# Patient Record
Sex: FEMALE | Race: WHITE | Employment: FULL TIME | URBAN - METROPOLITAN AREA
[De-identification: names, ages, dates, MRNs, and addresses within clinical notes are randomized per-mention and may not be internally consistent; named-entity substitution may affect disease eponyms.]

---

## 2019-04-29 ENCOUNTER — OFFICE VISIT (OUTPATIENT)
Dept: FAMILY MEDICINE | Facility: CLINIC | Age: 49
End: 2019-04-29
Payer: COMMERCIAL

## 2019-04-29 VITALS
HEIGHT: 65 IN | SYSTOLIC BLOOD PRESSURE: 106 MMHG | BODY MASS INDEX: 36.62 KG/M2 | DIASTOLIC BLOOD PRESSURE: 81 MMHG | TEMPERATURE: 97.2 F | HEART RATE: 125 BPM | OXYGEN SATURATION: 98 % | RESPIRATION RATE: 16 BRPM | WEIGHT: 219.8 LBS

## 2019-04-29 DIAGNOSIS — Z13.220 LIPID SCREENING: ICD-10-CM

## 2019-04-29 DIAGNOSIS — I48.91 ATRIAL FIBRILLATION, UNSPECIFIED TYPE (H): Primary | ICD-10-CM

## 2019-04-29 DIAGNOSIS — R00.2 PALPITATIONS: ICD-10-CM

## 2019-04-29 DIAGNOSIS — R42 DIZZINESS: ICD-10-CM

## 2019-04-29 LAB
ALBUMIN SERPL-MCNC: 4.2 G/DL (ref 3.4–5)
ALP SERPL-CCNC: 93 U/L (ref 40–150)
ALT SERPL W P-5'-P-CCNC: 31 U/L (ref 0–50)
ANION GAP SERPL CALCULATED.3IONS-SCNC: 6 MMOL/L (ref 3–14)
AST SERPL W P-5'-P-CCNC: 20 U/L (ref 0–45)
BILIRUB SERPL-MCNC: 0.6 MG/DL (ref 0.2–1.3)
BUN SERPL-MCNC: 19 MG/DL (ref 7–30)
CALCIUM SERPL-MCNC: 8.9 MG/DL (ref 8.5–10.1)
CHLORIDE SERPL-SCNC: 110 MMOL/L (ref 94–109)
CHOLEST SERPL-MCNC: 171 MG/DL
CO2 SERPL-SCNC: 25 MMOL/L (ref 20–32)
CREAT SERPL-MCNC: 0.87 MG/DL (ref 0.52–1.04)
ERYTHROCYTE [DISTWIDTH] IN BLOOD BY AUTOMATED COUNT: 13.9 % (ref 10–15)
GFR SERPL CREATININE-BSD FRML MDRD: 78 ML/MIN/{1.73_M2}
GLUCOSE SERPL-MCNC: 97 MG/DL (ref 70–99)
HCT VFR BLD AUTO: 42.2 % (ref 35–47)
HDLC SERPL-MCNC: 53 MG/DL
HGB BLD-MCNC: 13.8 G/DL (ref 11.7–15.7)
LDLC SERPL CALC-MCNC: 106 MG/DL
MCH RBC QN AUTO: 29.9 PG (ref 26.5–33)
MCHC RBC AUTO-ENTMCNC: 32.7 G/DL (ref 31.5–36.5)
MCV RBC AUTO: 91 FL (ref 78–100)
NONHDLC SERPL-MCNC: 118 MG/DL
PLATELET # BLD AUTO: 279 10E9/L (ref 150–450)
POTASSIUM SERPL-SCNC: 4.4 MMOL/L (ref 3.4–5.3)
PROT SERPL-MCNC: 7.6 G/DL (ref 6.8–8.8)
RBC # BLD AUTO: 4.62 10E12/L (ref 3.8–5.2)
SODIUM SERPL-SCNC: 141 MMOL/L (ref 133–144)
TRIGL SERPL-MCNC: 59 MG/DL
TSH SERPL DL<=0.005 MIU/L-ACNC: 1.87 MU/L (ref 0.4–4)
VIT B12 SERPL-MCNC: 652 PG/ML (ref 193–986)
WBC # BLD AUTO: 6.7 10E9/L (ref 4–11)

## 2019-04-29 PROCEDURE — 36415 COLL VENOUS BLD VENIPUNCTURE: CPT | Performed by: FAMILY MEDICINE

## 2019-04-29 PROCEDURE — 99204 OFFICE O/P NEW MOD 45 MIN: CPT | Performed by: FAMILY MEDICINE

## 2019-04-29 PROCEDURE — 80053 COMPREHEN METABOLIC PANEL: CPT | Performed by: FAMILY MEDICINE

## 2019-04-29 PROCEDURE — 80061 LIPID PANEL: CPT | Performed by: FAMILY MEDICINE

## 2019-04-29 PROCEDURE — 82607 VITAMIN B-12: CPT | Performed by: FAMILY MEDICINE

## 2019-04-29 PROCEDURE — 84443 ASSAY THYROID STIM HORMONE: CPT | Performed by: FAMILY MEDICINE

## 2019-04-29 PROCEDURE — 85027 COMPLETE CBC AUTOMATED: CPT | Performed by: FAMILY MEDICINE

## 2019-04-29 PROCEDURE — 93000 ELECTROCARDIOGRAM COMPLETE: CPT | Performed by: FAMILY MEDICINE

## 2019-04-29 PROCEDURE — 82306 VITAMIN D 25 HYDROXY: CPT | Performed by: FAMILY MEDICINE

## 2019-04-29 RX ORDER — MULTIPLE VITAMINS W/ MINERALS TAB 9MG-400MCG
1 TAB ORAL DAILY
COMMUNITY
End: 2019-07-30

## 2019-04-29 RX ORDER — CARVEDILOL 3.12 MG/1
3.12 TABLET ORAL 2 TIMES DAILY WITH MEALS
Qty: 30 TABLET | Refills: 1 | Status: SHIPPED | OUTPATIENT
Start: 2019-04-29 | End: 2019-04-29

## 2019-04-29 RX ORDER — CARVEDILOL 3.12 MG/1
3.12 TABLET ORAL 2 TIMES DAILY WITH MEALS
Qty: 60 TABLET | Refills: 1 | Status: SHIPPED | OUTPATIENT
Start: 2019-04-29 | End: 2019-07-08

## 2019-04-29 SDOH — HEALTH STABILITY: MENTAL HEALTH: HOW OFTEN DO YOU HAVE A DRINK CONTAINING ALCOHOL?: NEVER

## 2019-04-29 ASSESSMENT — MIFFLIN-ST. JEOR: SCORE: 1614.95

## 2019-04-29 NOTE — PATIENT INSTRUCTIONS
Patient Education     Atrial Fibrillation    Atrial fibrillation is a condition in which the heart beats in an irregular pattern. It is caused by a problem in the heart's electrical pathways. It can be a sign of heart disease or other health problems that affect the heart.  Heart palpitations are the most common symptom of atrial fibrillation. This is the feeling that your heart is fluttering, beating fast, hard, or irregular. When the heart beats too fast, it doesn't pump blood very well. This can cause other symptoms like anxiety, fatigue, shortness of breath, chest pain, dizziness, or fainting. Atrial fibrillation may come and go. It can last from a few hours to a couple of days. Or, it may become chronic, lasting for months at a time or even become permanent.  Atrial fibrillation may be caused by heart disease or other conditions in the body that affect the heart:    Coronary artery disease (atherosclerosis)    High blood pressure    Disease of the heart valves    Enlarged heart    Heart failure  Atrial fibrillation can also occur without heart disease because of:    Overactive thyroid (hyperthyroid)    Chronic lung disease (COPD, emphysema, bronchitis)    Heavy alcohol use    Cardiac stimulants like cocaine, amphetamines, diet pills, certain decongestant cold medicines, caffeine, or nicotine    Infection    Blood clot in the lung (pulmonary embolus)    Diabetes    Chronic kidney disease    Obesity    Extreme athletic conditioning  Treating or removing these causes will help your treatment for atrial fibrillation. It will also make it less likely for the atrial fibrillation to come back.  Atrial fibrillation can alternate back and forth with another abnormal rhythm called atrial flutter. Atrial flutter is a more regular heart rhythm and is also associated with an increased stroke risk. Proper treatment can lower your risk for stroke.  Home care  Follow these guidelines when caring for yourself at home:    Go  back to your usual activities as soon as you are feeling back to normal.    If you smoke, stop smoking. Contact your healthcare provider or a local stop-smoking program for help.    Don't use stimulants like alcohol, cocaine, amphetamines, diet pills, certain decongestant cold medicines, caffeine, or nicotine.    If your provider prescribed medicine to stop atrial fibrillation from coming back, take it exactly as directed. Some medicines must be taken every day, not just when you have symptoms. This will help them work as they should.    If you were prescribed warfarin to lower your risk for stroke, have your blood tested on a regular basis as advised by your provider. This will make sure you are getting the dose that is right for you. It also lower your risk for side effects.  Follow-up care  Follow up with your healthcare provider, or as advised.  When to seek medical advice  Call your healthcare provider right away if any of these following occur:    Shortness of breath or swelling in the legs gets worse    Unexpected weight gain    Chest pain or the sense that your heart is fluttering or beating fast or hard (palpitations)    Any sign of bleeding if you are on a blood thinner     Pain, redness, or swelling in one leg  Also call your provider right away if you have these signs of stroke:    Weakness of an arm or leg or one side of the face    Difficulty with speech or vision    Extreme drowsiness, confusion, dizziness, or fainting  Date Last Reviewed: 5/1/2016 2000-2018 [employer]. [address] [place] [id number]. All rights reserved. This information is not intended as a substitute for professional medical care. Always follow your healthcare professional's instructions.

## 2019-04-29 NOTE — PROGRESS NOTES
SUBJECTIVE:   Farida Toth is a 48 year old female who presents to clinic today for the following   health issues:    Establish care.   New patient to [facility].    Dizziness  Onset: x5 months    Description:   Do you feel faint:  YES  Does it feel like the surroundings (bed, room) are moving: no   Unsteady/off balance: YES  Have you passed out or fallen: no     Intensity: moderate; will occur 2 or more times/ week.     Progression of Symptoms:  same    Accompanying Signs & Symptoms:  Heart palpitations: YES  Nausea, vomiting: no   Weakness in arms or legs: YES- feels out of shape   Fatigue: YES  Vision or speech changes: no   Ringing in ears (Tinnitus): no   Hearing Loss: no     History:   Head trauma/concussion hx: no   Previous similar symptoms: no   Recent bleeding history: no     Precipitating factors:   Worse with activity or head movement: YES- Patient states the more active she is the more often it happens.   Any new medications (BP?): no   Alcohol/drug abuse/withdrawal: no     Alleviating factors:   Does staying in a fixed position give relief:  no     Also reporting pain in left side of neck when it occurs along with SOB and sweating.     Therapies Tried and outcome: cut out caffeine x4 months ago to help with her symptoms.      Denies having any other known chronic underlying medical problems like hypertension, heart failure or any previous history of strokes.     Additional history: as documented    Reviewed  and updated as needed this visit by clinical staff  Tobacco  Allergies  Meds  Med Hx  Surg Hx  Fam Hx  Soc Hx        Reviewed and updated as needed this visit by Provider  Tobacco  Med Hx  Surg Hx  Fam Hx  Soc Hx        Patient Active Problem List   Diagnosis     Family history of colon cancer in mother     Atrial fibrillation, unspecified type (H)     Past Surgical History:   Procedure Laterality Date     NO HISTORY OF SURGERY         Social History     Tobacco Use     Smoking status:  "Never Smoker     Smokeless tobacco: Never Used   Substance Use Topics     Alcohol use: Never     Frequency: Never     Family History   Problem Relation Age of Onset     Colon Cancer Mother         Dx at age 58 and  at age 59 with mets     Stomach Cancer Father      Breast Cancer Sister         Dx in her 40s         Current Outpatient Medications   Medication Sig Dispense Refill     aspirin (ASA) 81 MG EC tablet Take 1 tablet (81 mg) by mouth daily 90 tablet 3     BIOTIN PO        carvedilol (COREG) 3.125 MG tablet Take 1 tablet (3.125 mg) by mouth 2 times daily (with meals) 60 tablet 1     multivitamin w/minerals (MULTI-VITAMIN) tablet Take 1 tablet by mouth daily       No Known Allergies    ROS:  Constitutional, HEENT, cardiovascular, pulmonary, GI, , musculoskeletal, skin, endocrine and psych systems are negative, except as otherwise noted.  Neuro as described above    OBJECTIVE:     /81 (BP Location: Left arm, Patient Position: Standing, Cuff Size: Adult Large)   Pulse 125   Temp 97.2  F (36.2  C) (Tympanic)   Resp 16   Ht 1.638 m (5' 4.5\")   Wt 99.7 kg (219 lb 12.8 oz)   SpO2 98%   Breastfeeding? No   BMI 37.15 kg/m    Body mass index is 37.15 kg/m .  GENERAL: healthy, alert and no distress  HENT: ear canals and TM's normal, nose and mouth without ulcers or lesions  NECK: no adenopathy, no asymmetry, masses, or scars and thyroid normal to palpation  RESP: lungs clear to auscultation - no rales, rhonchi or wheezes  CV: irregular rate and rhythm, normal S1 S2, no S3 or S4, no murmur, click or rub, no peripheral edema and peripheral pulses strong  ABDOMEN: soft, nontender, no hepatosplenomegaly, no masses and bowel sounds normal  MS: no gross musculoskeletal defects noted, no edema  NEURO: Normal strength and tone, sensory exam grossly normal, mentation intact and cranial nerves 2-12 intact    Diagnostic Test Results:  Labs drawn and in process  EKG - atrial fibrillation, rate 90 bpm, normal " axis, normal intervals, no acute ST/T changes c/w ischemia, no LVH by voltage criteria, there are no prior tracings available    ASSESSMENT/PLAN:     Farida was seen today for dizziness.    Diagnoses and all orders for this visit:    Atrial fibrillation, unspecified type (H); new onset  Hemodynamically stable without heart failure. [id number] 0-1  -     Start: carvedilol (COREG) 3.125 MG tablet; Take 1 tablet (3.125 mg) by mouth 2 times daily (with meals)  -     Start: aspirin (ASA) 81 MG EC tablet; Take 1 tablet (81 mg) by mouth daily  -     Echocardiogram Complete; Future  -     CARDIOLOGY EVAL ADULT REFERRAL    Dizziness  -     CBC with platelets  -     TSH with free T4 reflex  -     Comprehensive metabolic panel  -     Vitamin B12  -     Vitamin D Deficiency  -     EKG 12-lead complete w/read - Clinics    Palpitations  -     CBC with platelets  -     TSH with free T4 reflex  -     EKG 12-lead complete w/read - Clinics      Lipid screening  -     Lipid Profile      Follow up as scheduled on 5/7/2019 for a Physical exam, sooner if needed      Leonora Chew MD  [facility]

## 2019-04-30 LAB — DEPRECATED CALCIDIOL+CALCIFEROL SERPL-MC: 32 UG/L (ref 20–75)

## 2019-05-02 ENCOUNTER — ANCILLARY PROCEDURE (OUTPATIENT)
Dept: CARDIOLOGY | Facility: CLINIC | Age: 49
End: 2019-05-02
Attending: FAMILY MEDICINE
Payer: COMMERCIAL

## 2019-05-02 DIAGNOSIS — I48.91 ATRIAL FIBRILLATION, UNSPECIFIED TYPE (H): ICD-10-CM

## 2019-05-02 PROCEDURE — 40000264 ZZHC STATISTIC IV PUSH SINGLE INITIAL SUBSTANCE: Performed by: INTERNAL MEDICINE

## 2019-05-02 PROCEDURE — 93306 TTE W/DOPPLER COMPLETE: CPT | Mod: GC | Performed by: INTERNAL MEDICINE

## 2019-05-02 RX ADMIN — Medication 7 ML: at 12:00

## 2019-05-03 ENCOUNTER — HEALTH MAINTENANCE LETTER (OUTPATIENT)
Age: 49
End: 2019-05-03

## 2019-05-03 ASSESSMENT — ENCOUNTER SYMPTOMS
PARESTHESIAS: 0
CONSTIPATION: 0
EYE PAIN: 0
HEADACHES: 0
JOINT SWELLING: 0
FEVER: 0
HEMATURIA: 0
WEAKNESS: 0
SORE THROAT: 0
HEMATOCHEZIA: 0
SHORTNESS OF BREATH: 0
FREQUENCY: 0
DIARRHEA: 0
DYSURIA: 0
NAUSEA: 0
BREAST MASS: 0
MYALGIAS: 0
DIZZINESS: 1
COUGH: 0
ABDOMINAL PAIN: 0
ARTHRALGIAS: 0
NERVOUS/ANXIOUS: 0
HEARTBURN: 0
PALPITATIONS: 0
CHILLS: 0

## 2019-05-07 ENCOUNTER — OFFICE VISIT (OUTPATIENT)
Dept: FAMILY MEDICINE | Facility: CLINIC | Age: 49
End: 2019-05-07
Payer: COMMERCIAL

## 2019-05-07 VITALS
SYSTOLIC BLOOD PRESSURE: 99 MMHG | HEIGHT: 65 IN | RESPIRATION RATE: 16 BRPM | WEIGHT: 219.6 LBS | HEART RATE: 118 BPM | TEMPERATURE: 97.6 F | BODY MASS INDEX: 36.59 KG/M2 | DIASTOLIC BLOOD PRESSURE: 53 MMHG | OXYGEN SATURATION: 97 %

## 2019-05-07 DIAGNOSIS — Z12.4 CERVICAL CANCER SCREENING: ICD-10-CM

## 2019-05-07 DIAGNOSIS — L82.1 SEBORRHEIC KERATOSIS: ICD-10-CM

## 2019-05-07 DIAGNOSIS — Z12.31 ENCOUNTER FOR SCREENING MAMMOGRAM FOR BREAST CANCER: ICD-10-CM

## 2019-05-07 DIAGNOSIS — Z00.00 ROUTINE GENERAL MEDICAL EXAMINATION AT A HEALTH CARE FACILITY: Primary | ICD-10-CM

## 2019-05-07 DIAGNOSIS — I48.91 ATRIAL FIBRILLATION, UNSPECIFIED TYPE (H): ICD-10-CM

## 2019-05-07 PROCEDURE — G0124 SCREEN C/V THIN LAYER BY MD: HCPCS | Performed by: FAMILY MEDICINE

## 2019-05-07 PROCEDURE — G0145 SCR C/V CYTO,THINLAYER,RESCR: HCPCS | Performed by: FAMILY MEDICINE

## 2019-05-07 PROCEDURE — 87624 HPV HI-RISK TYP POOLED RSLT: CPT | Performed by: FAMILY MEDICINE

## 2019-05-07 PROCEDURE — 99396 PREV VISIT EST AGE 40-64: CPT | Performed by: FAMILY MEDICINE

## 2019-05-07 ASSESSMENT — ENCOUNTER SYMPTOMS
HEARTBURN: 0
PARESTHESIAS: 0
DIARRHEA: 0
NERVOUS/ANXIOUS: 0
CONSTIPATION: 0
HEMATOCHEZIA: 0
SORE THROAT: 0
BREAST MASS: 0
MYALGIAS: 0
JOINT SWELLING: 0
HEMATURIA: 0
DIZZINESS: 1
ABDOMINAL PAIN: 0
PALPITATIONS: 0
EYE PAIN: 0
HEADACHES: 0
NAUSEA: 0
DYSURIA: 0
FEVER: 0
SHORTNESS OF BREATH: 0
WEAKNESS: 0
COUGH: 0
ARTHRALGIAS: 0
FREQUENCY: 0
CHILLS: 0

## 2019-05-07 ASSESSMENT — MIFFLIN-ST. JEOR: SCORE: 1614.04
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
## 2019-05-07 NOTE — PATIENT INSTRUCTIONS
Preventive Health Recommendations  Female Ages 40 to 49    Yearly exam:     See your health care provider every year in order to  1. Review health changes.   2. Discuss preventive care.    3. Review your medicines if your doctor prescribed any.      Get a Pap test every three years (unless you have an abnormal result and your provider advises testing more often).      If you get Pap tests with HPV test, you only need to test every 5 years, unless you have an abnormal result. You do not need a Pap test if your uterus was removed (hysterectomy) and you have not had cancer.      You should be tested each year for STDs (sexually transmitted diseases), if you're at risk.     Ask your doctor if you should have a mammogram.      Have a colonoscopy (test for colon cancer) if someone in your family has had colon cancer or polyps before age 50.       Have a cholesterol test every 5 years.       Have a diabetes test (fasting glucose) after age 45. If you are at risk for diabetes, you should have this test every 3 years.    Shots: Get a flu shot each year. Get a tetanus shot every 10 years.     Nutrition:     Eat at least 5 servings of fruits and vegetables each day.    Eat whole-grain bread, whole-wheat pasta and brown rice instead of white grains and rice.    Get adequate Calcium and Vitamin D.      Lifestyle    Exercise at least 150 minutes a week (an average of 30 minutes a day, 5 days a week). This will help you control your weight and prevent disease.    Limit alcohol to one drink per day.    No smoking.     Wear sunscreen to prevent skin cancer.    See your dentist every six months for an exam and cleaning.  Patient Education     Understanding Seborrheic Keratosis  Seborrheic keratoses are wart-like growths on the skin. These growths are not cancer. Many people get them, especially after age 30.  How to say it  [id number]   What causes seborrheic keratoses?  Doctors do not know what causes seborrheic  keratoses. They may run in families. In addition, they become more common as people get older.  What are the symptoms of seborrheic keratoses?  Here is what seborrheic keratoses often look like:  They tend to be round or oval in shape. They can be very small or about as big across as a quarter.  They can appear singly or in clusters.  They tend to be tan, brown, or black in color.  The edges may be scalloped or uneven-looking.  They can have a waxy or scaly look.  They can be a bit raised, appearing to be  stuck on  the skin.  They may become red and irritated if scratched or rubbed by clothing  Sebhorreic keratoses are not painful, but they may be itchy. They can become irritated if they are continually rubbed by skin or clothing. Seborrheic keratoses most often appear on the face, arms, chest, back, or belly.  How are seborrheic keratoses treated?  Seborrheic keratoses don t need to be treated unless they bother you. You may choose to have them removed because you find them unattractive. You may also want them removed because they get irritated and uncomfortable. A healthcare provider can remove them in an office visit. Ways that seborrheic keratoses can be removed include:  Freezing them off with liquid nitrogen  Cutting them off with a scalpel  Burning them off with electricity  What are the complications of seborrheic keratoses?  Seborrheic keratoses are not cancer, but they can look like some types of skin cancer. So it s a good idea to ask your healthcare provider to check any new skin growths. Ask your healthcare provider about any skin problem that concerns you.  When should I call my healthcare provider?  Call your healthcare provider right away if any of these occur:  You develop a lot of seborrheic keratoses very quickly  You have a sore that does not heal within a few weeks, or heals and then comes back  You have a mole or skin growth that is changing in size, shape, or color  You have a mole or skin  growth that looks different on one side from the other  You have a mole or skin growth that is not the same color throughout   Date Last Reviewed: 5/1/2016 2000-2018 [employer]. [address] [place] [address], [place] [address]. All rights reserved. This information is not intended as a substitute for professional medical care. Always follow your healthcare professional's instructions.

## 2019-05-09 ENCOUNTER — OFFICE VISIT (OUTPATIENT)
Dept: CARDIOLOGY | Facility: CLINIC | Age: 49
End: 2019-05-09
Payer: COMMERCIAL

## 2019-05-09 ENCOUNTER — HOSPITAL ENCOUNTER (OUTPATIENT)
Facility: CLINIC | Age: 49
End: 2019-05-09
Payer: COMMERCIAL

## 2019-05-09 VITALS
WEIGHT: 221 LBS | HEART RATE: 64 BPM | DIASTOLIC BLOOD PRESSURE: 75 MMHG | BODY MASS INDEX: 37.35 KG/M2 | SYSTOLIC BLOOD PRESSURE: 117 MMHG | OXYGEN SATURATION: 99 %

## 2019-05-09 DIAGNOSIS — I48.91 ATRIAL FIBRILLATION, UNSPECIFIED TYPE (H): Primary | ICD-10-CM

## 2019-05-09 PROCEDURE — 93000 ELECTROCARDIOGRAM COMPLETE: CPT | Performed by: INTERNAL MEDICINE

## 2019-05-09 PROCEDURE — 99215 OFFICE O/P EST HI 40 MIN: CPT | Performed by: INTERNAL MEDICINE

## 2019-05-09 RX ORDER — POTASSIUM CHLORIDE 750 MG/1
20 TABLET, EXTENDED RELEASE ORAL
Status: CANCELLED | OUTPATIENT
Start: 2019-05-09

## 2019-05-09 RX ORDER — POTASSIUM CHLORIDE 750 MG/1
40 TABLET, EXTENDED RELEASE ORAL
Status: CANCELLED | OUTPATIENT
Start: 2019-05-09

## 2019-05-09 RX ORDER — LIDOCAINE 40 MG/G
CREAM TOPICAL
Status: CANCELLED | OUTPATIENT
Start: 2019-05-09

## 2019-05-09 NOTE — PATIENT INSTRUCTIONS
Thank you for coming to the [facility] @ [facility]; please note the following instructions:    1. We have scheduled you for a TRANSESOPHAGEAL ECHOCARDIOGRAM WITH ELECTRICAL CARDIOVERSION procedure at the [facility] on ____ at _____.  Please arrive at _____ to the GOLD waiting area at the [facility].     PRE-PROCEDURE INSTRUCTIONS:    * NOTHING TO EAT OR DRINK FOR 6 HOURS PRIOR TO PROCEDURE    *TAKE USUAL MEDICATIONS WITH A SIP OF WATER    .      2.  Follow up in 1 month with an EKG.        If you have any questions regarding your visit please contact your care team:     Cardiology  Telephone Number   Aurora ARNOLD, RN  Isabel VERNON, RN   Anupama HORVATH, RMA  Kala BREWSTER, EMIRA  Olamide CASTRO, [facility]   [phone] (option 1)   For scheduling appts:     [phone] (select option 1)       For the Device Clinic (Pacemakers and ICD's)  RN's :  Celestina Brown   During business hours: [phone]    *After business hours:  [phone] (select option 4)      Normal test result notifications will be released via [employer] ([email or web address]) or mailed within 7 business days.  All other test results, will be communicated via telephone once reviewed by your cardiologist.    If you need a medication refill please contact your pharmacy.  Please allow 3 business days for your refill to be completed.    As always, thank you for trusting us with your health care needs!

## 2019-05-09 NOTE — PROGRESS NOTES
SUBJECTIVE:  Farida Toth is a 48 year old female who presents for evaluation of atrial fibrillation flutter.    Symptoms started after flulike illness during last Francesca.  Intermittently patient feels very fast heart rate.  This is mainly with exertion.  Feels short of breath, tired and dizzy.  Once she fell down and broke her cell phone.  Since December symptoms are progressive.  And patient get very short of breath with brief exertion.  She uses a Fitbit and her heart rate goes up to 140 bpm as per patient.  No prior history of cardiac arrhythmia.  No significant family history.  No significant risk factors.  No history of excess alcohol or caffeine.  No significant comorbidities.  Work as a  with [employer].  Patient Active Problem List    Diagnosis Date Noted     Atrial fibrillation, unspecified type (H) 04/29/2019     Priority: Medium     Family history of colon cancer in mother      Priority: Medium    .  Current Outpatient Medications   Medication Sig     aspirin (ASA) 81 MG EC tablet Take 1 tablet (81 mg) by mouth daily     BIOTIN PO      carvedilol (COREG) 3.125 MG tablet Take 1 tablet (3.125 mg) by mouth 2 times daily (with meals)     multivitamin w/minerals (MULTI-VITAMIN) tablet Take 1 tablet by mouth daily     No current facility-administered medications for this visit.      Past Medical History:   Diagnosis Date     Atrial fibrillation (H)      Diverticula of colon      Family history of colon cancer in mother      Uterine fibroid      Past Surgical History:   Procedure Laterality Date     NO HISTORY OF SURGERY       No Known Allergies  Social History     Socioeconomic History     Marital status: Single     Spouse name: Not on file     Number of children: Not on file     Years of education: Not on file     Highest education level: Not on file   Occupational History     Not on file   Social Needs     Financial resource strain: Not on file     Food insecurity:     Worry: Not  on file     Inability: Not on file     Transportation needs:     Medical: Not on file     Non-medical: Not on file   Tobacco Use     Smoking status: Never Smoker     Smokeless tobacco: Never Used   Substance and Sexual Activity     Alcohol use: Never     Frequency: Never     Drug use: Never     Sexual activity: Yes     Partners: Male     Birth control/protection: None   Lifestyle     Physical activity:     Days per week: Not on file     Minutes per session: Not on file     Stress: Not on file   Relationships     Social connections:     Talks on phone: Not on file     Gets together: Not on file     Attends Buddhist service: Not on file     Active member of club or organization: Not on file     Attends meetings of clubs or organizations: Not on file     Relationship status: Not on file     Intimate partner violence:     Fear of current or ex partner: Not on file     Emotionally abused: Not on file     Physically abused: Not on file     Forced sexual activity: Not on file   Other Topics Concern     Not on file   Social History Narrative     Not on file     Family History   Problem Relation Age of Onset     Colon Cancer Mother         Dx at age 58 and  at age 59 with mets     Stomach Cancer Father      Breast Cancer Sister         Dx in her 40s          REVIEW OF SYSTEMS:  General: negative, fever, chills, night sweats  Skin: negative, acne, rash and scaling  Eyes: negative, double vision, eye pain and photophobia  Ears/Nose/Throat: negative, nasal congestion and purulent rhinorrhea  Respiratory: No cough, No hemoptysis and negative  Cardiovascular: negative, chest pain, exertional chest pain or pressure, paroxysmal nocturnal dyspnea and orthopnea  Gastrointestinal: negative, dysphagia, nausea, vomiting and heartburn  Genitourinary: negative, nocturia, dysuria, frequency and urgency  Musculoskeletal: negative, fracture, back pain, neck pain and arthritis  Neurologic: negative, headaches, syncope, stroke and  seizures  Psychiatric: negative, nervous breakdown, thoughts of self-harm and thoughts of hurting someone else  Hematologic/Lymphatic/Immunologic: negative, bleeding disorder, chills and fever  Endocrine: negative, cold intolerance, heat intolerance and hot flashes       OBJECTIVE:  Blood pressure 117/75, pulse 64, weight 100.2 kg (221 lb), SpO2 99 %, not currently breastfeeding.  General Appearance: healthy, alert, active and no distress  Head: Normocephalic. No masses, lesions, tenderness or abnormalities  Eyes: conjuctiva clear, PERRL, EOM intact  Ears: External ears normal. Canals clear. TM's normal.  Nose: Nares normal  Mouth: normal  Neck: Supple, no cervical adenopathy, no thyromegaly  Lungs: clear to auscultation  Cardiac: regular rate and rhythm, normal S1 and S2, no murmur  Abdomen: Soft, nontender.  Normal bowel sounds.  No hepatosplenomegaly or abnormal masses  Extremities: no peripheral edema, peripheral pulses normal  Musculoskeletal: negative  Neurological: Cranial nerves 2-12 intact, motor strength intact       ASSESSMENT/PLAN:  Patient with the intermittent tachycardia since last Christmas.  Started after flulike symptoms.  With exertion her heart rate goes up to 140.  Feels short of breath lightheaded and dizzy.  One fall but no loss of consciousness.  No significant cardiac risk factors.  No excess coffee alcohol.  No significant family history.  Reviewed EKG.  First EKG looks like atrial flutter/fibrillation.  Today's EKG looks more like atrial flutter with variable block.  Patient is on Coreg 3.125 mg twice daily.  At rest rate is controlled.  Discussed options.  As conversion to sinus rhythm in case of flutter is difficult.  Discussed the option of ablation versus cardioversion.  Probably the whole episode started with flulike symptoms.  Will plan for cardioversion first.  If flutter recur will plan for ablation.  Reviewed echocardiogram and distal discussed with patient.  Normal LV RV size and  function.  Normal atrial size.  No significant valvular heart disease.  Will start patient on Eliquis 5 mg twice daily.  Will plan for STAN cardioversion early next week as she is very symptomatic.  Risk benefit explained to patient.  Per orders.   Return to Clinic  1 month.

## 2019-05-09 NOTE — LETTER
5/9/2019      RE: Farida Toth  [address] [facility] [address]     Dear Colleague,    Thank you for the opportunity to participate in the care of your patient, Farida Toth, at the [facility] AT [facility] at [facility]. Please see a copy of my visit note below.     SUBJECTIVE:  Farida Toth is a 48 year old female who presents for evaluation of atrial fibrillation flutter.    Symptoms started after flulike illness during last Lester.  Intermittently patient feels very fast heart rate.  This is mainly with exertion.  Feels short of breath, tired and dizzy.  Once she fell down and broke her cell phone.  Since December symptoms are progressive.  And patient get very short of breath with brief exertion.  She uses a Fitbit and her heart rate goes up to 140 bpm as per patient.  No prior history of cardiac arrhythmia.  No significant family history.  No significant risk factors.  No history of excess alcohol or caffeine.  No significant comorbidities.  Work as a  with [employer].  Patient Active Problem List    Diagnosis Date Noted     Atrial fibrillation, unspecified type (H) 04/29/2019     Priority: Medium     Family history of colon cancer in mother      Priority: Medium    .  Current Outpatient Medications   Medication Sig     aspirin (ASA) 81 MG EC tablet Take 1 tablet (81 mg) by mouth daily     BIOTIN PO      carvedilol (COREG) 3.125 MG tablet Take 1 tablet (3.125 mg) by mouth 2 times daily (with meals)     multivitamin w/minerals (MULTI-VITAMIN) tablet Take 1 tablet by mouth daily     No current facility-administered medications for this visit.      Past Medical History:   Diagnosis Date     Atrial fibrillation (H)      Diverticula of colon      Family history of colon cancer in mother      Uterine fibroid      Past Surgical History:   Procedure Laterality Date     NO HISTORY OF SURGERY       No Known  Allergies  Social History     Socioeconomic History     Marital status: Single     Spouse name: Not on file     Number of children: Not on file     Years of education: Not on file     Highest education level: Not on file   Occupational History     Not on file   Social Needs     Financial resource strain: Not on file     Food insecurity:     Worry: Not on file     Inability: Not on file     Transportation needs:     Medical: Not on file     Non-medical: Not on file   Tobacco Use     Smoking status: Never Smoker     Smokeless tobacco: Never Used   Substance and Sexual Activity     Alcohol use: Never     Frequency: Never     Drug use: Never     Sexual activity: Yes     Partners: Male     Birth control/protection: None   Lifestyle     Physical activity:     Days per week: Not on file     Minutes per session: Not on file     Stress: Not on file   Relationships     Social connections:     Talks on phone: Not on file     Gets together: Not on file     Attends Christian service: Not on file     Active member of club or organization: Not on file     Attends meetings of clubs or organizations: Not on file     Relationship status: Not on file     Intimate partner violence:     Fear of current or ex partner: Not on file     Emotionally abused: Not on file     Physically abused: Not on file     Forced sexual activity: Not on file   Other Topics Concern     Not on file   Social History Narrative     Not on file     Family History   Problem Relation Age of Onset     Colon Cancer Mother         Dx at age 58 and  at age 59 with mets     Stomach Cancer Father      Breast Cancer Sister         Dx in her 40s          REVIEW OF SYSTEMS:  General: negative, fever, chills, night sweats  Skin: negative, acne, rash and scaling  Eyes: negative, double vision, eye pain and photophobia  Ears/Nose/Throat: negative, nasal congestion and purulent rhinorrhea  Respiratory: No cough, No hemoptysis and negative  Cardiovascular: negative, chest  pain, exertional chest pain or pressure, paroxysmal nocturnal dyspnea and orthopnea  Gastrointestinal: negative, dysphagia, nausea, vomiting and heartburn  Genitourinary: negative, nocturia, dysuria, frequency and urgency  Musculoskeletal: negative, fracture, back pain, neck pain and arthritis  Neurologic: negative, headaches, syncope, stroke and seizures  Psychiatric: negative, nervous breakdown, thoughts of self-harm and thoughts of hurting someone else  Hematologic/Lymphatic/Immunologic: negative, bleeding disorder, chills and fever  Endocrine: negative, cold intolerance, heat intolerance and hot flashes       OBJECTIVE:  Blood pressure 117/75, pulse 64, weight 100.2 kg (221 lb), SpO2 99 %, not currently breastfeeding.  General Appearance: healthy, alert, active and no distress  Head: Normocephalic. No masses, lesions, tenderness or abnormalities  Eyes: conjuctiva clear, PERRL, EOM intact  Ears: External ears normal. Canals clear. TM's normal.  Nose: Nares normal  Mouth: normal  Neck: Supple, no cervical adenopathy, no thyromegaly  Lungs: clear to auscultation  Cardiac: regular rate and rhythm, normal S1 and S2, no murmur  Abdomen: Soft, nontender.  Normal bowel sounds.  No hepatosplenomegaly or abnormal masses  Extremities: no peripheral edema, peripheral pulses normal  Musculoskeletal: negative  Neurological: Cranial nerves 2-12 intact, motor strength intact       ASSESSMENT/PLAN:  Patient with the intermittent tachycardia since last Christmas.  Started after flulike symptoms.  With exertion her heart rate goes up to 140.  Feels short of breath lightheaded and dizzy.  One fall but no loss of consciousness.  No significant cardiac risk factors.  No excess coffee alcohol.  No significant family history.  Reviewed EKG.  First EKG looks like atrial flutter/fibrillation.  Today's EKG looks more like atrial flutter with variable block.  Patient is on Coreg 3.125 mg twice daily.  At rest rate is controlled.  Discussed  options.  As conversion to sinus rhythm in case of flutter is difficult.  Discussed the option of ablation versus cardioversion.  Probably the whole episode started with flulike symptoms.  Will plan for cardioversion first.  If flutter recur will plan for ablation.  Reviewed echocardiogram and distal discussed with patient.  Normal LV RV size and function.  Normal atrial size.  No significant valvular heart disease.  Will start patient on Eliquis 5 mg twice daily.  Will plan for STAN cardioversion early next week as she is very symptomatic.  Risk benefit explained to patient.  Per orders.   Return to Clinic  1 month.    Please do not hesitate to contact me if you have any questions/concerns.     Sincerely,     MIKY Mejia MD

## 2019-05-12 ENCOUNTER — ANESTHESIA EVENT (OUTPATIENT)
Dept: SURGERY | Facility: CLINIC | Age: 49
End: 2019-05-12
Payer: COMMERCIAL

## 2019-05-12 RX ORDER — SODIUM CHLORIDE, SODIUM LACTATE, POTASSIUM CHLORIDE, CALCIUM CHLORIDE 600; 310; 30; 20 MG/100ML; MG/100ML; MG/100ML; MG/100ML
INJECTION, SOLUTION INTRAVENOUS CONTINUOUS
Status: CANCELLED | OUTPATIENT
Start: 2019-05-12

## 2019-05-12 RX ORDER — FENTANYL CITRATE 50 UG/ML
25-50 INJECTION, SOLUTION INTRAMUSCULAR; INTRAVENOUS EVERY 5 MIN PRN
Status: CANCELLED | OUTPATIENT
Start: 2019-05-12

## 2019-05-12 RX ORDER — MEPERIDINE HYDROCHLORIDE 50 MG/ML
12.5 INJECTION INTRAMUSCULAR; INTRAVENOUS; SUBCUTANEOUS
Status: CANCELLED | OUTPATIENT
Start: 2019-05-12

## 2019-05-12 RX ORDER — ONDANSETRON 4 MG/1
4 TABLET, ORALLY DISINTEGRATING ORAL EVERY 30 MIN PRN
Status: CANCELLED | OUTPATIENT
Start: 2019-05-12

## 2019-05-12 RX ORDER — NALOXONE HYDROCHLORIDE 0.4 MG/ML
.1-.4 INJECTION, SOLUTION INTRAMUSCULAR; INTRAVENOUS; SUBCUTANEOUS
Status: CANCELLED | OUTPATIENT
Start: 2019-05-12 | End: 2019-05-13

## 2019-05-12 RX ORDER — ONDANSETRON 2 MG/ML
4 INJECTION INTRAMUSCULAR; INTRAVENOUS EVERY 30 MIN PRN
Status: CANCELLED | OUTPATIENT
Start: 2019-05-12

## 2019-05-12 NOTE — ANESTHESIA PREPROCEDURE EVALUATION
Anesthesia Pre-Procedure Evaluation    Patient: Farida Toth   MRN:     [id number] Gender:   female   Age:    48 year old :      1970        Preoperative Diagnosis: Atrial Fibrillation   Procedure(s):  Anesthesia Coverage Cardioversion @1130     Past Medical History:   Diagnosis Date     Atrial fibrillation (H)      Diverticula of colon      Family history of colon cancer in mother      Uterine fibroid       Past Surgical History:   Procedure Laterality Date     NO HISTORY OF SURGERY            Anesthesia Evaluation     . Pt has had prior anesthetic.            ROS/MED HX    ENT/Pulmonary:       Neurologic:       Cardiovascular:     (+) ----. Taking blood thinners : Instructions Given to patient: apixaban. . . :. . Previous cardiac testing Echodate:19results:   Interpretation Summary     Normal global and regional left ventricular function, calculated LVEF 58%  using traced biplane with contrast.  Right ventricular function, chamber size, wall motion, and thickness are  normal.  No significant valvular abnormalities.  The inferior vena cava was normal in size with preserved respiratory  variability.  _____________________________________________________________________________date: results:ECG reviewed date:19 results:A flutter with variable AVB Rate 72 date: results:          METS/Exercise Tolerance:     Hematologic:         Musculoskeletal:         GI/Hepatic:         Renal/Genitourinary:         Endo:     (+) Obesity, .      Psychiatric:         Infectious Disease:         Malignancy:         Other:                         PHYSICAL EXAM:   Mental Status/Neuro: A/A/O; Age Appropriate   Airway: Facies: Feasible  Mallampati: I  Mouth/Opening: Full  TM distance: > 6 cm  Neck ROM: Full   Respiratory: Auscultation: CTAB     Resp. Rate: Normal     Resp. Effort: Normal      CV: Rhythm: Irregular   Comments:      Dental: Normal                  Lab Results   Component Value Date    WBC 6.7 2019  "   HGB 13.8 04/29/2019    HCT 42.2 04/29/2019     04/29/2019     04/29/2019    POTASSIUM 4.4 04/29/2019    CHLORIDE 110 (H) 04/29/2019    CO2 25 04/29/2019    BUN 19 04/29/2019    CR 0.87 04/29/2019    GLC 97 04/29/2019    NOVA 8.9 04/29/2019    ALBUMIN 4.2 04/29/2019    PROTTOTAL 7.6 04/29/2019    ALT 31 04/29/2019    AST 20 04/29/2019    ALKPHOS 93 04/29/2019    BILITOTAL 0.6 04/29/2019    TSH 1.87 04/29/2019       Preop Vitals  BP Readings from Last 3 Encounters:   05/09/19 117/75   05/07/19 99/53   04/29/19 106/81    Pulse Readings from Last 3 Encounters:   05/09/19 64   05/07/19 118   04/29/19 125      Resp Readings from Last 3 Encounters:   05/07/19 16   04/29/19 16    SpO2 Readings from Last 3 Encounters:   05/09/19 99%   05/07/19 97%   04/29/19 98%      Temp Readings from Last 1 Encounters:   05/07/19 36.4  C (97.6  F) (Tympanic)    Ht Readings from Last 1 Encounters:   05/07/19 1.638 m (5' 4.5\")      Wt Readings from Last 1 Encounters:   05/09/19 100.2 kg (221 lb)    Estimated body mass index is 37.35 kg/m  as calculated from the following:    Height as of 5/7/19: 1.638 m (5' 4.5\").    Weight as of 5/9/19: 100.2 kg (221 lb).     LDA:            Assessment:   ASA SCORE: 2    NPO Status: > 2 hours since completed Clear Liquids; > 6 hours since completed Solid Foods   Documentation: H&P complete; Preop Testing complete; Consents complete   Proceeding: Proceed without further delay  Tobacco Use:  NO Active use of Tobacco/UNKNOWN Tobacco use status     Plan:   Anes. Type:  General   Pre-Induction: Midazolam IV   Induction:  IV (Standard)   Airway: Oral ETT   Access/Monitoring: PIV   Maintenance: Balanced   Emergence: Procedure Site   Logistics: Same Day Surgery     Postop Pain/Sedation Strategy:  Standard-Options: Opioids PRN     PONV Management:  Adult Risk Factors: Female, Non-Smoker, Postop Opioids  Prevention: Ondansetron; Propofol Infusion     CONSENT: Direct conversation   Plan and risks " discussed with: Patient   Blood Products: Consent Deferred (Minimal Blood Loss)                         Juanis Flores MD

## 2019-05-13 ENCOUNTER — ANESTHESIA (OUTPATIENT)
Dept: SURGERY | Facility: CLINIC | Age: 49
End: 2019-05-13
Payer: COMMERCIAL

## 2019-05-13 ENCOUNTER — APPOINTMENT (OUTPATIENT)
Dept: MEDSURG UNIT | Facility: CLINIC | Age: 49
End: 2019-05-13
Payer: COMMERCIAL

## 2019-05-13 ENCOUNTER — HOSPITAL ENCOUNTER (OUTPATIENT)
Dept: CARDIOLOGY | Facility: CLINIC | Age: 49
End: 2019-05-13
Attending: INTERNAL MEDICINE
Payer: COMMERCIAL

## 2019-05-13 ENCOUNTER — APPOINTMENT (OUTPATIENT)
Dept: LAB | Facility: CLINIC | Age: 49
End: 2019-05-13
Attending: INTERNAL MEDICINE
Payer: COMMERCIAL

## 2019-05-13 ENCOUNTER — HOSPITAL ENCOUNTER (OUTPATIENT)
Facility: CLINIC | Age: 49
Discharge: HOME | End: 2019-05-13
Attending: INTERNAL MEDICINE | Admitting: INTERNAL MEDICINE
Payer: COMMERCIAL

## 2019-05-13 VITALS
HEART RATE: 71 BPM | OXYGEN SATURATION: 96 % | SYSTOLIC BLOOD PRESSURE: 105 MMHG | DIASTOLIC BLOOD PRESSURE: 72 MMHG | RESPIRATION RATE: 14 BRPM

## 2019-05-13 VITALS
TEMPERATURE: 98 F | SYSTOLIC BLOOD PRESSURE: 114 MMHG | RESPIRATION RATE: 16 BRPM | HEART RATE: 64 BPM | OXYGEN SATURATION: 100 % | DIASTOLIC BLOOD PRESSURE: 86 MMHG

## 2019-05-13 VITALS
HEART RATE: 78 BPM | TEMPERATURE: 97.4 F | SYSTOLIC BLOOD PRESSURE: 108 MMHG | RESPIRATION RATE: 18 BRPM | DIASTOLIC BLOOD PRESSURE: 78 MMHG | OXYGEN SATURATION: 98 %

## 2019-05-13 DIAGNOSIS — I48.91 ATRIAL FIBRILLATION, UNSPECIFIED TYPE (H): ICD-10-CM

## 2019-05-13 LAB
B-HCG SERPL-ACNC: 2 IU/L (ref 0–5)
COPATH REPORT: ABNORMAL
INR PPP: 1.34 (ref 0.86–1.14)
MAGNESIUM SERPL-MCNC: 2.3 MG/DL (ref 1.6–2.3)
PAP: ABNORMAL
POTASSIUM SERPL-SCNC: 4.4 MMOL/L (ref 3.4–5.3)

## 2019-05-13 PROCEDURE — 93010 ELECTROCARDIOGRAM REPORT: CPT | Performed by: INTERNAL MEDICINE

## 2019-05-13 PROCEDURE — 83735 ASSAY OF MAGNESIUM: CPT | Performed by: INTERNAL MEDICINE

## 2019-05-13 PROCEDURE — 40000166 ZZH STATISTIC PP CARE STAGE 1

## 2019-05-13 PROCEDURE — 99152 MOD SED SAME PHYS/QHP 5/>YRS: CPT

## 2019-05-13 PROCEDURE — 40000065 ZZH STATISTIC EKG NON-CHARGEABLE

## 2019-05-13 PROCEDURE — 84702 CHORIONIC GONADOTROPIN TEST: CPT | Performed by: INTERNAL MEDICINE

## 2019-05-13 PROCEDURE — 25000128 H RX IP 250 OP 636: Performed by: INTERNAL MEDICINE

## 2019-05-13 PROCEDURE — 25000125 ZZHC RX 250: Performed by: INTERNAL MEDICINE

## 2019-05-13 PROCEDURE — 93320 DOPPLER ECHO COMPLETE: CPT | Mod: 26 | Performed by: INTERNAL MEDICINE

## 2019-05-13 PROCEDURE — 92960 CARDIOVERSION ELECTRIC EXT: CPT | Mod: GC | Performed by: INTERNAL MEDICINE

## 2019-05-13 PROCEDURE — 25000125 ZZHC RX 250: Performed by: NURSE ANESTHETIST, CERTIFIED REGISTERED

## 2019-05-13 PROCEDURE — 92960 CARDIOVERSION ELECTRIC EXT: CPT

## 2019-05-13 PROCEDURE — 36415 COLL VENOUS BLD VENIPUNCTURE: CPT | Performed by: INTERNAL MEDICINE

## 2019-05-13 PROCEDURE — 85610 PROTHROMBIN TIME: CPT | Performed by: INTERNAL MEDICINE

## 2019-05-13 PROCEDURE — 93312 ECHO TRANSESOPHAGEAL: CPT | Mod: 26 | Performed by: INTERNAL MEDICINE

## 2019-05-13 PROCEDURE — 93320 DOPPLER ECHO COMPLETE: CPT

## 2019-05-13 PROCEDURE — 37000008 ZZH ANESTHESIA TECHNICAL FEE, 1ST 30 MIN

## 2019-05-13 PROCEDURE — 25800025 ZZH RX 258: Performed by: INTERNAL MEDICINE

## 2019-05-13 PROCEDURE — 84132 ASSAY OF SERUM POTASSIUM: CPT | Performed by: INTERNAL MEDICINE

## 2019-05-13 PROCEDURE — 93325 DOPPLER ECHO COLOR FLOW MAPG: CPT | Mod: 26 | Performed by: INTERNAL MEDICINE

## 2019-05-13 RX ORDER — POTASSIUM CHLORIDE 1500 MG/1
40 TABLET, EXTENDED RELEASE ORAL
Status: DISCONTINUED | OUTPATIENT
Start: 2019-05-13 | End: 2019-05-14 | Stop reason: HOSPADM

## 2019-05-13 RX ORDER — FENTANYL CITRATE 50 UG/ML
50-100 INJECTION, SOLUTION INTRAMUSCULAR; INTRAVENOUS
Status: COMPLETED | OUTPATIENT
Start: 2019-05-13 | End: 2019-05-13

## 2019-05-13 RX ORDER — SODIUM CHLORIDE 9 MG/ML
1000 INJECTION, SOLUTION INTRAVENOUS CONTINUOUS
Status: DISCONTINUED | OUTPATIENT
Start: 2019-05-13 | End: 2019-05-14 | Stop reason: HOSPADM

## 2019-05-13 RX ORDER — ATROPINE SULFATE 0.1 MG/ML
.5-1 INJECTION INTRAVENOUS
Status: DISCONTINUED | OUTPATIENT
Start: 2019-05-13 | End: 2019-05-14 | Stop reason: HOSPADM

## 2019-05-13 RX ORDER — LIDOCAINE 40 MG/G
CREAM TOPICAL
Status: DISCONTINUED | OUTPATIENT
Start: 2019-05-13 | End: 2019-05-14 | Stop reason: HOSPADM

## 2019-05-13 RX ORDER — POTASSIUM CHLORIDE 1500 MG/1
20 TABLET, EXTENDED RELEASE ORAL
Status: DISCONTINUED | OUTPATIENT
Start: 2019-05-13 | End: 2019-05-14 | Stop reason: HOSPADM

## 2019-05-13 RX ORDER — FENTANYL CITRATE 50 UG/ML
25-50 INJECTION, SOLUTION INTRAMUSCULAR; INTRAVENOUS
Status: DISCONTINUED | OUTPATIENT
Start: 2019-05-13 | End: 2019-05-14 | Stop reason: HOSPADM

## 2019-05-13 RX ORDER — FLUMAZENIL 0.1 MG/ML
0.2 INJECTION, SOLUTION INTRAVENOUS
Status: DISCONTINUED | OUTPATIENT
Start: 2019-05-13 | End: 2019-05-14 | Stop reason: HOSPADM

## 2019-05-13 RX ORDER — NALOXONE HYDROCHLORIDE 0.4 MG/ML
.1-.4 INJECTION, SOLUTION INTRAMUSCULAR; INTRAVENOUS; SUBCUTANEOUS
Status: DISCONTINUED | OUTPATIENT
Start: 2019-05-13 | End: 2019-05-14 | Stop reason: HOSPADM

## 2019-05-13 RX ORDER — ACYCLOVIR 200 MG/1
5 CAPSULE ORAL ONCE
Status: COMPLETED | OUTPATIENT
Start: 2019-05-13 | End: 2019-05-13

## 2019-05-13 RX ADMIN — MIDAZOLAM HYDROCHLORIDE 2.5 MG: 2 INJECTION, SOLUTION INTRAMUSCULAR; INTRAVENOUS at 12:26

## 2019-05-13 RX ADMIN — TOPICAL ANESTHETIC 0.5 ML: 200 SPRAY DENTAL; PERIODONTAL at 12:03

## 2019-05-13 RX ADMIN — LIDOCAINE HYDROCHLORIDE 30 ML: 20 SOLUTION ORAL; TOPICAL at 12:03

## 2019-05-13 RX ADMIN — SODIUM CHLORIDE 5 ML: 9 INJECTION, SOLUTION INTRAMUSCULAR; INTRAVENOUS; SUBCUTANEOUS at 12:15

## 2019-05-13 RX ADMIN — METHOHEXITAL SODIUM 55 MG: 500 INJECTION, POWDER, LYOPHILIZED, FOR SOLUTION INTRAMUSCULAR; INTRAVENOUS; RECTAL at 13:16

## 2019-05-13 RX ADMIN — FENTANYL CITRATE 75 MCG: 50 INJECTION INTRAMUSCULAR; INTRAVENOUS at 12:26

## 2019-05-13 NOTE — ANESTHESIA CARE TRANSFER NOTE
Patient: Farida Toth    Procedure(s):  Anesthesia Coverage Cardioversion @1130    Diagnosis: Atrial Fibrillation  Diagnosis Additional Information: No value filed.    Anesthesia Type:   No value filed.     Note:  Airway :Nasal Cannula  Destination: ECHO Rn.        Vitals: (Last set prior to Anesthesia Care Transfer)    CRNA VITALS  5/13/2019 1253 - 5/13/2019 1323      5/13/2019             NIBP:  117/85    Ht Rate:  73    SpO2:  94 %    Resp Rate (observed):  12    EKG:  Sinus rhythm                Electronically Signed By: ANJU Sheikh CRNA  May 13, 2019  1:23 PM

## 2019-05-13 NOTE — PROCEDURES
CARDIOVERSION    PROCEDURE:  direct current cardioversion  PROCEDURE DATE: 5/13/2019     Pre-procedure diagnosis:  Atrial flutter  Post-procedure diagnosis: normal sinus rhythm  Complications:  none    BRIEF CLINICAL HISTORY:  47 yo female with history of symptomatic atrial flutter after a URI.      PROCEDURE:  The patient arrived at the Echo Laboratory in a fasting, non-sedated state.  Informed consent was previously obtained from patient, who understood indications, risks, and benefits of the procedure.  Anticoagulation with apixaban was verified including this morning. Transesophageal echo was performed by the echo lab team to rule out intracardiac thrombus.  With help from Anesthesia, patient was deeply sedated.  150J of synchronized biphasic shock was delivered and the patient was successfully converted to normal sinus rhythm.  After sedation wore off, patient awoke and remained neurologically intact.  The patient tolerated the procedure well from a hemodynamic and respiratory standpoint without any complications.  She was observed in the Echo Laboratory and then discharged to home after sedation wore off and she was ambulatory.    IMPRESSION:  1.  Successful direct current cardioversion with 150J biphasic shock.    RECOMMENDATIONS/PLANS:  1.   Follow-up with Dr. Cardona  2.   Continue anticoagulation    Fredi Bronson MD  Cardiovascular Medicine Fellow  P: [phone]    EP STAFF NOTE  I was present throughout the procedure. I agree with the note above by the EP fellow.  Pb Chow MD [facility]  Cardiology - Electrophysiology

## 2019-05-13 NOTE — PROGRESS NOTES
Pt arrived via cart with RN s/p Cardioversion. VSS. Pt denies pain. Pt can eat solid food @ 1400 per MD orders. Family at bedside. Plan discharge at 1440 per MD orders.

## 2019-05-13 NOTE — PROGRESS NOTES
Pt arrived in ECHO department  for scheduled STAN.   Procedure explained, questions answered and consent signed. Discharge instructions discussed with patient.  Pt's throat sprayed at 1200, therefore pt will not be able to eat or drink until 2 hours after at 1400 .  Informed pt of this time and encouraged to start with warm fluids and soft foods.    Pt tolerated procedure well, and was given a total of 75 mcg IV fentanyl and 2.5 mg IV versed for conscious sedation.  Pt denied throat or chest pain after STAN complete.   STAN probe 60 used for procedure.  No clots visualized on STAN so proceeded with DCCV.  Anesthesia gave pt 55 mg IV brevitol for sedation and pt was DCCV at 150 Joules to a SR.  Pt denied C.P or throat pain after procedure and was D/C home after awake and VSS.      Report given to 2A RN and pt escorted to room.

## 2019-05-14 ENCOUNTER — TELEPHONE (OUTPATIENT)
Dept: CARDIOLOGY | Facility: CLINIC | Age: 49
End: 2019-05-14

## 2019-05-14 PROBLEM — R87.610 ASCUS OF CERVIX WITH NEGATIVE HIGH RISK HPV: Status: ACTIVE | Noted: 2019-05-07

## 2019-05-14 LAB
FINAL DIAGNOSIS: NORMAL
HPV HR 12 DNA CVX QL NAA+PROBE: NEGATIVE
HPV16 DNA SPEC QL NAA+PROBE: NEGATIVE
HPV18 DNA SPEC QL NAA+PROBE: NEGATIVE
INTERPRETATION ECG - MUSE: NORMAL
INTERPRETATION ECG - MUSE: NORMAL
SPECIMEN DESCRIPTION: NORMAL
SPECIMEN SOURCE CVX/VAG CYTO: NORMAL

## 2019-05-14 NOTE — ANESTHESIA POSTPROCEDURE EVALUATION
Anesthesia POST Procedure Evaluation    Patient: Farida Toth   MRN:     [id number] Gender:   female   Age:    48 year old :      1970        Preoperative Diagnosis: Atrial Fibrillation   Procedure(s):  Anesthesia Coverage Cardioversion @1130   Postop Comments: No value filed.       Anesthesia Type:  General  No value filed.    Reportable Event: NO     PAIN: Uncomplicated   Sign Out status: Comfortable, Well controlled pain     PONV: No PONV   Sign Out status:  No Nausea or Vomiting     Neuro/Psych: Uneventful perioperative course   Sign Out Status: Preoperative baseline; Age appropriate mentation     Airway/Resp.: Uneventful perioperative course   Sign Out Status: Non labored breathing, age appropriate RR; Resp. Status within EXPECTED Parameters     CV: Uneventful perioperative course   Sign Out status: Appropriate BP and perfusion indices; Appropriate HR/Rhythm     Disposition:   Sign Out in:  PACU  Disposition:  Phase II; Home  Recovery Course: Uneventful  Follow-Up: Not required           Last Anesthesia Record Vitals:      Last PACU Vitals:  No vitals data found for the desired time range.        Electronically Signed By: Juanis Flores MD, May 14, 2019, 4:03 PM

## 2019-05-14 NOTE — TELEPHONE ENCOUNTER
"Call to pt. Stated she feels \"great, never  Better\" after cardioversion.   No complaints, process went well- appreciated all staff involved.     Follow up appt confirmed. Instructed pt to stay on AC until her follow up visit scheduled with Dr torres, pt agreed, verbalized understanding  "

## 2019-05-28 ENCOUNTER — MYC MEDICAL ADVICE (OUTPATIENT)
Dept: CARDIOLOGY | Facility: CLINIC | Age: 49
End: 2019-05-28

## 2019-05-30 ENCOUNTER — ANCILLARY PROCEDURE (OUTPATIENT)
Dept: MAMMOGRAPHY | Facility: CLINIC | Age: 49
End: 2019-05-30
Attending: FAMILY MEDICINE
Payer: COMMERCIAL

## 2019-05-30 DIAGNOSIS — Z12.31 ENCOUNTER FOR SCREENING MAMMOGRAM FOR BREAST CANCER: ICD-10-CM

## 2019-05-30 PROCEDURE — 77063 BREAST TOMOSYNTHESIS BI: CPT | Mod: TC

## 2019-05-30 PROCEDURE — 77067 SCR MAMMO BI INCL CAD: CPT | Mod: TC

## 2019-06-06 ENCOUNTER — OFFICE VISIT (OUTPATIENT)
Dept: CARDIOLOGY | Facility: CLINIC | Age: 49
End: 2019-06-06
Payer: COMMERCIAL

## 2019-06-06 VITALS
SYSTOLIC BLOOD PRESSURE: 115 MMHG | DIASTOLIC BLOOD PRESSURE: 64 MMHG | HEART RATE: 64 BPM | WEIGHT: 220 LBS | BODY MASS INDEX: 37.18 KG/M2 | OXYGEN SATURATION: 98 %

## 2019-06-06 DIAGNOSIS — I48.91 ATRIAL FIBRILLATION, UNSPECIFIED TYPE (H): Primary | ICD-10-CM

## 2019-06-06 DIAGNOSIS — I48.92 ATRIAL FLUTTER, UNSPECIFIED TYPE (H): ICD-10-CM

## 2019-06-06 PROCEDURE — 93000 ELECTROCARDIOGRAM COMPLETE: CPT | Performed by: INTERNAL MEDICINE

## 2019-06-06 PROCEDURE — 99213 OFFICE O/P EST LOW 20 MIN: CPT | Performed by: INTERNAL MEDICINE

## 2019-06-06 NOTE — PATIENT INSTRUCTIONS
Thank you for coming to the [facility] @ [facility]; please note the following instructions:    1. Decrease coreg as instructed by Dr Cardona in half for 2 weeks then stop    2. 1 month ziopatch    IF the ziopatch comes back with no atrial fibrillation or flutter, we will stop the eliquis      If you have any questions regarding your visit please contact your care team:     Cardiology  Telephone Number   Aurora QURESHI., RN  Isabel VERNON, RN   Anupama HORVATH, RMA  Kala BREWSTER, RMA  Olamide CASTRO, [facility]   [phone] (option 1)   For scheduling appts:     [phone] (select option 1)       For the Device Clinic (Pacemakers and ICD's)  RN's :  Celestina Brown   During business hours: [phone]    *After business hours:  [phone] (select option 4)      Normal test result notifications will be released via Block[employer] or mailed within 7 business days.  All other test results, will be communicated via telephone once reviewed by your cardiologist.    If you need a medication refill please contact your pharmacy.  Please allow 3 business days for your refill to be completed.    As always, thank you for trusting us with your health care needs!

## 2019-06-06 NOTE — LETTER
6/6/2019      RE: Farida Toth  [address] [facility] [address]       Dear Colleague,    Thank you for the opportunity to participate in the care of your patient, Farida Toth, at the [facility] AT [facility]. Please see a copy of my visit note below.       SUBJECTIVE:  Farida Toth is a 48 year old female who presents for post cardioversion follow-up.    In December last year patient had flulike symptoms.  This was followed by palpitation.  Especially with exertion she had a very fast heart rate and shortness of breath.  EKG showed atrial flutter.  Patient had cardioversion and here for follow-up.  Doing very well.  Patient do not have any complaints.  After cardioversion all her symptoms subsided.  Currently on Eliquis and Coreg 3.125 mg twice daily.    Patient Active Problem List    Diagnosis Date Noted     ASCUS of cervix with negative high risk HPV 05/07/2019     Priority: Medium     1991, 1995, 2006, 2010, 2012 NIL paps.   5/7/19 ASCUS pap with Neg HPV. Plan cotest in 3 years.        Atrial fibrillation, unspecified type (H) 04/29/2019     Priority: Medium     Family history of colon cancer in mother      Priority: Medium    .  Current Outpatient Medications   Medication Sig     apixaban ANTICOAGULANT (ELIQUIS) 5 MG tablet Take 1 tablet (5 mg) by mouth 2 times daily     aspirin (ASA) 81 MG EC tablet Take 1 tablet (81 mg) by mouth daily     BIOTIN PO      carvedilol (COREG) 3.125 MG tablet Take 1 tablet (3.125 mg) by mouth 2 times daily (with meals)     multivitamin w/minerals (MULTI-VITAMIN) tablet Take 1 tablet by mouth daily     No current facility-administered medications for this visit.      Past Medical History:   Diagnosis Date     Abnormal Pap smear of cervix 05/07/2019    See problem list     Atrial fibrillation (H)      Diverticula of colon      Family history of colon cancer in mother      Uterine  fibroid      Past Surgical History:   Procedure Laterality Date     ANESTHESIA CARDIOVERSION N/A 2019    Procedure: Anesthesia Coverage Cardioversion @1130;  Surgeon: GENERIC ANESTHESIA PROVIDER;  Location: [facility] OR     NO HISTORY OF SURGERY       No Known Allergies  Social History     Socioeconomic History     Marital status: Single     Spouse name: Not on file     Number of children: Not on file     Years of education: Not on file     Highest education level: Not on file   Occupational History     Not on file   Social Needs     Financial resource strain: Not on file     Food insecurity:     Worry: Not on file     Inability: Not on file     Transportation needs:     Medical: Not on file     Non-medical: Not on file   Tobacco Use     Smoking status: Never Smoker     Smokeless tobacco: Never Used   Substance and Sexual Activity     Alcohol use: Never     Frequency: Never     Drug use: Never     Sexual activity: Yes     Partners: Male     Birth control/protection: None   Lifestyle     Physical activity:     Days per week: Not on file     Minutes per session: Not on file     Stress: Not on file   Relationships     Social connections:     Talks on phone: Not on file     Gets together: Not on file     Attends Episcopal service: Not on file     Active member of club or organization: Not on file     Attends meetings of clubs or organizations: Not on file     Relationship status: Not on file     Intimate partner violence:     Fear of current or ex partner: Not on file     Emotionally abused: Not on file     Physically abused: Not on file     Forced sexual activity: Not on file   Other Topics Concern     Not on file   Social History Narrative     Not on file     Family History   Problem Relation Age of Onset     Colon Cancer Mother         Dx at age 58 and  at age 59 with mets     Stomach Cancer Father      Breast Cancer Sister         Dx in her 40s          REVIEW OF SYSTEMS:  General: negative, fever, chills, night  sweats  Skin: negative, acne, rash and scaling  Eyes: negative, double vision, eye pain and photophobia  Ears/Nose/Throat: negative, nasal congestion and purulent rhinorrhea  Respiratory: No dyspnea on exertion, No cough, No hemoptysis and negative  Cardiovascular: negative, palpitations, tachycardia, irregular heart beat, chest pain, exertional chest pain or pressure, paroxysmal nocturnal dyspnea, dyspnea on exertion and orthopnea       OBJECTIVE:  Blood pressure 115/64, pulse 64, weight 99.8 kg (220 lb), SpO2 98 %, not currently breastfeeding.  General Appearance: healthy, alert, active and no distress  Head: Normocephalic. No masses, lesions, tenderness or abnormalities  Eyes: conjuctiva clear, PERRL, EOM intact  Ears: External ears normal. Canals clear. TM's normal.  Nose: Nares normal  Mouth: normal  Neck: Supple, no cervical adenopathy, no thyromegaly  Lungs: clear to auscultation  Cardiac: regular rate and rhythm, normal S1 and S2, no murmur       ASSESSMENT:/PLAN:  Patient here for follow-up after cardioversion of atrial flutter.  Doing very well and remains asymptomatic.  Patient remained in sinus rhythm.  Today's EKG reviewed normal sinus rhythm sinus bradycardia.  As the atrial flutter was precipitated by flulike illness, will try to discontinue anticoagulation and beta-blocker.  Patient do not like taking any pill.  Advised to continue Coreg for 1 more month to is the end of month the last 1 week she will be cutting the 3.125 mg tablet into half and taking it twice a day and then will stop the Coreg after 1 week.  At that point will put a 2-weekZio0 patch and no recurrence of atrial flutter fibrillation we will discontinue the Eliquis also.  Patient will follow up on a as needed basis.  Per orders.   Return to Clinic PRN.    Please do not hesitate to contact me if you have any questions/concerns.     Sincerely,     MIKY Mejia MD

## 2019-06-06 NOTE — NURSING NOTE
"Chief Complaint   Patient presents with     RECHECK     OV with Dr. ALESSIA Cardona for 1 month s/p DCC. Pt reports repoerts no cardiac symptoms.       Initial /64 (BP Location: Left arm, Patient Position: Chair, Cuff Size: Adult Large)   Pulse 64   Wt 99.8 kg (220 lb)   SpO2 98%   BMI 37.18 kg/m   Estimated body mass index is 37.18 kg/m  as calculated from the following:    Height as of 5/7/19: 1.638 m (5' 4.5\").    Weight as of this encounter: 99.8 kg (220 lb)..  BP completed using cuff size: large    EKG was done.  Kala Champagne MA    "

## 2019-06-10 NOTE — PROGRESS NOTES
SUBJECTIVE:  Farida Toth is a 48 year old female who presents for post cardioversion follow-up.    In December last year patient had flulike symptoms.  This was followed by palpitation.  Especially with exertion she had a very fast heart rate and shortness of breath.  EKG showed atrial flutter.  Patient had cardioversion and here for follow-up.  Doing very well.  Patient do not have any complaints.  After cardioversion all her symptoms subsided.  Currently on Eliquis and Coreg 3.125 mg twice daily.    Patient Active Problem List    Diagnosis Date Noted     ASCUS of cervix with negative high risk HPV 05/07/2019     Priority: Medium     1991, 1995, 2006, 2010, 2012 NIL paps.   5/7/19 ASCUS pap with Neg HPV. Plan cotest in 3 years.        Atrial fibrillation, unspecified type (H) 04/29/2019     Priority: Medium     Family history of colon cancer in mother      Priority: Medium    .  Current Outpatient Medications   Medication Sig     apixaban ANTICOAGULANT (ELIQUIS) 5 MG tablet Take 1 tablet (5 mg) by mouth 2 times daily     aspirin (ASA) 81 MG EC tablet Take 1 tablet (81 mg) by mouth daily     BIOTIN PO      carvedilol (COREG) 3.125 MG tablet Take 1 tablet (3.125 mg) by mouth 2 times daily (with meals)     multivitamin w/minerals (MULTI-VITAMIN) tablet Take 1 tablet by mouth daily     No current facility-administered medications for this visit.      Past Medical History:   Diagnosis Date     Abnormal Pap smear of cervix 05/07/2019    See problem list     Atrial fibrillation (H)      Diverticula of colon      Family history of colon cancer in mother      Uterine fibroid      Past Surgical History:   Procedure Laterality Date     ANESTHESIA CARDIOVERSION N/A 5/13/2019    Procedure: Anesthesia Coverage Cardioversion @1130;  Surgeon: GENERIC ANESTHESIA PROVIDER;  Location: [facility]     NO HISTORY OF SURGERY       No Known Allergies  Social History     Socioeconomic History     Marital status: Single     Spouse name:  Not on file     Number of children: Not on file     Years of education: Not on file     Highest education level: Not on file   Occupational History     Not on file   Social Needs     Financial resource strain: Not on file     Food insecurity:     Worry: Not on file     Inability: Not on file     Transportation needs:     Medical: Not on file     Non-medical: Not on file   Tobacco Use     Smoking status: Never Smoker     Smokeless tobacco: Never Used   Substance and Sexual Activity     Alcohol use: Never     Frequency: Never     Drug use: Never     Sexual activity: Yes     Partners: Male     Birth control/protection: None   Lifestyle     Physical activity:     Days per week: Not on file     Minutes per session: Not on file     Stress: Not on file   Relationships     Social connections:     Talks on phone: Not on file     Gets together: Not on file     Attends Protestant service: Not on file     Active member of club or organization: Not on file     Attends meetings of clubs or organizations: Not on file     Relationship status: Not on file     Intimate partner violence:     Fear of current or ex partner: Not on file     Emotionally abused: Not on file     Physically abused: Not on file     Forced sexual activity: Not on file   Other Topics Concern     Not on file   Social History Narrative     Not on file     Family History   Problem Relation Age of Onset     Colon Cancer Mother         Dx at age 58 and  at age 59 with mets     Stomach Cancer Father      Breast Cancer Sister         Dx in her 40s          REVIEW OF SYSTEMS:  General: negative, fever, chills, night sweats  Skin: negative, acne, rash and scaling  Eyes: negative, double vision, eye pain and photophobia  Ears/Nose/Throat: negative, nasal congestion and purulent rhinorrhea  Respiratory: No dyspnea on exertion, No cough, No hemoptysis and negative  Cardiovascular: negative, palpitations, tachycardia, irregular heart beat, chest pain, exertional chest  pain or pressure, paroxysmal nocturnal dyspnea, dyspnea on exertion and orthopnea       OBJECTIVE:  Blood pressure 115/64, pulse 64, weight 99.8 kg (220 lb), SpO2 98 %, not currently breastfeeding.  General Appearance: healthy, alert, active and no distress  Head: Normocephalic. No masses, lesions, tenderness or abnormalities  Eyes: conjuctiva clear, PERRL, EOM intact  Ears: External ears normal. Canals clear. TM's normal.  Nose: Nares normal  Mouth: normal  Neck: Supple, no cervical adenopathy, no thyromegaly  Lungs: clear to auscultation  Cardiac: regular rate and rhythm, normal S1 and S2, no murmur       ASSESSMENT:/PLAN:  Patient here for follow-up after cardioversion of atrial flutter.  Doing very well and remains asymptomatic.  Patient remained in sinus rhythm.  Today's EKG reviewed normal sinus rhythm sinus bradycardia.  As the atrial flutter was precipitated by flulike illness, will try to discontinue anticoagulation and beta-blocker.  Patient do not like taking any pill.  Advised to continue Coreg for 1 more month to is the end of month the last 1 week she will be cutting the 3.125 mg tablet into half and taking it twice a day and then will stop the Coreg after 1 week.  At that point will put a 2-weekZio0 patch and no recurrence of atrial flutter fibrillation we will discontinue the Eliquis also.  Patient will follow up on a as needed basis.  Per orders.   Return to Clinic PRN.

## 2019-07-08 ENCOUNTER — ANCILLARY PROCEDURE (OUTPATIENT)
Dept: CARDIOLOGY | Facility: CLINIC | Age: 49
End: 2019-07-08
Attending: INTERNAL MEDICINE
Payer: COMMERCIAL

## 2019-07-08 DIAGNOSIS — I48.91 ATRIAL FIBRILLATION, UNSPECIFIED TYPE (H): ICD-10-CM

## 2019-07-08 PROCEDURE — 0296T ZZHC  EXT ECG > 48HR TO 21 DAY RCRD W/CONECT INTL RCRD: CPT | Performed by: INTERNAL MEDICINE

## 2019-07-08 PROCEDURE — 0298T ZIO PATCH HOLTER ADULT PEDIATRIC GREATER THAN 48 HRS: CPT | Performed by: INTERNAL MEDICINE

## 2019-07-29 ENCOUNTER — TELEPHONE (OUTPATIENT)
Dept: CARDIOLOGY | Facility: CLINIC | Age: 49
End: 2019-07-29

## 2019-07-29 NOTE — TELEPHONE ENCOUNTER
Patient received discontinue  ASA. And Eliquis order by telephone.     Patient will await cardiology follow up instruction .Patient informed family Doctor visit will be tomorrow in process. Olamide Mari L.P.N.

## 2019-07-29 NOTE — TELEPHONE ENCOUNTER
[facility] Call Center    Phone Message    May a detailed message be left on voicemail: yes    Reason for Call: Other: Pt returned the call she missed from Aurora. Please give her another call back.     Action Taken: Message routed to:  [facility] Cardiology

## 2019-07-29 NOTE — TELEPHONE ENCOUNTER
[facility] Call Center    Phone Message    May a detailed message be left on voicemail: yes    Reason for Call: Other: Pt has been on ELIQUIS for almost four months and said she started bleeding like she has her period on Saturday and now today it's a full blown period. Per Pt, she hasn't had her period in 5 years. Please call Pt back to discuss if this is a side affect of the medication, or something that she should be concerned with.      Action Taken: Message routed to:  [facility]

## 2019-07-29 NOTE — TELEPHONE ENCOUNTER
Preliminary zio shows no atrial fibrillation.  Per. Dr. Cardona, patient can stop Eliquis.  Attempted to contact patient, no answer, message left to call clinic back.  Aurora Lester RN

## 2019-07-29 NOTE — TELEPHONE ENCOUNTER
Date: 7/29/2019    Time of Call: 11:54 AM     Diagnosis:  Afib/menstral bleeding.  On anticoagulation     [ [facility] ] Ordering provider: Dr. torres  Order: STOP Eliquis and STOP aspirin     Order received by: Aurora Lester RN

## 2019-07-29 NOTE — TELEPHONE ENCOUNTER
Spoke to patient who states that she did take her Eliquis morning dose today.  Starting Sunday am she has been experiencing vaginal bleeding where she has to change her pad every 5 hours.  Blood clots are present.  She also noticed that she has a small bruise on her arm and on her chest.  No injury noted.  No other obvious signs of bleeding.  Denies lightheadedness, dizziness, fatigue.  Is currently at work.    Writer advised to go to urgent care today to be evaluated.  Advised stop Eliquis.  Patient wanting to know if she should continue to take aspririn.  Advised to hold at this time. Patient verbalized understanding.    Aurora Lester, ANYI  Care Coordinator  [facility] [place] [facility]  [phone]

## 2019-07-30 ENCOUNTER — OFFICE VISIT (OUTPATIENT)
Dept: FAMILY MEDICINE | Facility: CLINIC | Age: 49
End: 2019-07-30
Payer: COMMERCIAL

## 2019-07-30 VITALS
SYSTOLIC BLOOD PRESSURE: 111 MMHG | RESPIRATION RATE: 16 BRPM | OXYGEN SATURATION: 98 % | HEART RATE: 74 BPM | BODY MASS INDEX: 37.65 KG/M2 | TEMPERATURE: 97.3 F | WEIGHT: 222.8 LBS | DIASTOLIC BLOOD PRESSURE: 73 MMHG

## 2019-07-30 DIAGNOSIS — N95.0 POST-MENOPAUSAL BLEEDING: Primary | ICD-10-CM

## 2019-07-30 DIAGNOSIS — R10.31 RLQ ABDOMINAL PAIN: ICD-10-CM

## 2019-07-30 DIAGNOSIS — R10.32 LLQ ABDOMINAL PAIN: ICD-10-CM

## 2019-07-30 DIAGNOSIS — I48.91 ATRIAL FIBRILLATION, UNSPECIFIED TYPE (H): ICD-10-CM

## 2019-07-30 PROCEDURE — 99214 OFFICE O/P EST MOD 30 MIN: CPT | Performed by: NURSE PRACTITIONER

## 2019-07-30 NOTE — TELEPHONE ENCOUNTER
Spoke to patient and reviewed MD's response again.  Patient states she has stopped her Eliquis and aspirin, per Dr. Torres.  Saw primary care today and is scheduled to have a pelvic ultrasound tomorrow.  Advised patient no follow up needed with Dr. torres at this time and can see on an as needed basis.  Also advised to call with any questions or concerns.  Patient verbalized understanding.      Aurora Lester RN  Care Coordinator  [facility] [place] [facility]  [phone]        complains of pain/discomfort

## 2019-07-30 NOTE — PATIENT INSTRUCTIONS
Reminders: If you are signed up for [facility] please be aware your results and communications will be sent within your mychart. Please remember to arrive 5-10 minutes early for your appointments. If you are late you may need to reschedule your appointment.    Patient Education     Unknown Causes of Abdominal Pain (Female)    The exact cause of your belly (abdominal) pain is not clear. This does not mean that this is something to worry about. Everyone likes to know the exact cause of the problem. But sometimes with belly pain, there is no clear-cut cause, and this could be a good thing. The good news is that your symptoms can be treated, and you will feel better.   Your condition does not seem serious now. But sometimes the signs of a serious problem may take more time to appear. For this reason, it is important for you to watch for any new symptoms, problems, or worsening of your condition.  Over the next few days, the abdominal pain may come and go. Or it may be constant. Other common symptoms can include nausea and vomiting. Sometimes it can be difficult to tell if you feel nauseous. You may just feel bad and not connect that feeling to nausea. Constipation, diarrhea, and a fever may go along with the pain.  The pain may continue even if treated correctly over the following days. Depending on how things go, sometimes the cause can become clear and may need more or different treatment. Additional evaluations, medicines, or tests may also be needed.  Home care  Your healthcare provider may prescribe medicine for pain, symptoms, or an infection.  Follow the healthcare provider's instructions for taking these medicines.  General care    Rest as much as you can until your next exam. No strenuous activities.    Try to find positions that ease discomfort. A small pillow placed on the abdomen may help relieve pain.    Something warm on your abdomen (such as a heating pad) may help, but be careful not to burn  yourself.  Diet    Don t force yourself to eat, especially if having cramps, vomiting, or diarrhea.    Water is important so you don't get dehydrated. Soup may also be good. Sports drinks may also help, especially if they are not too acidic. Don't drink sugary drinks as this can make things worse. Take liquids in small amounts. Don t guzzle them.    Caffeine sometimes makes the pain and cramping worse.    Don t take dairy products if you have vomiting or diarrhea.    Don't eat large amounts at a time. Wait a few minutes between bites.    Eat a diet low in fiber (called a low-residue diet). Foods allowed include refined breads, white rice, fruit and vegetable juices without pulp, tender meats. These foods will pass more easily through the intestine.    Don t have whole-grain foods, whole fruits and vegetables, meats, seeds and nuts, fried or fatty foods, dairy, alcohol and spicy foods until your symptoms go away.  Follow-up care  Follow up with your healthcare provider, or as advised, if your pain does not begin to improve in the next 24 hours.  Call 911  Call 911 if any of these occur:    Trouble breathing    Confusion    Fainting or loss of consciousness    Rapid heart rate    Seizure  When to seek medical advice  Call your healthcare provider right away if any of these occur:    Pain gets worse or moves to the right lower abdomen    New or worsening vomiting or diarrhea    Swelling of the abdomen    Unable to pass stool for more than 3 days    Fever of 100.4 F (38 C) or higher, or as directed by your healthcare provider.    Blood in vomit or bowel movements (dark red or black color)    Yellow color of eyes and skin (jaundice)    Weakness, dizziness    Chest, arm, back, neck, or jaw pain    Unexpected vaginal bleeding or missed period    Can't keep down liquids or water and you are getting dehydrated  Date Last Reviewed: 6/1/2018 2000-2018 [employer]. [address], [place] [address]. All  rights reserved. This information is not intended as a substitute for professional medical care. Always follow your healthcare professional's instructions.

## 2019-07-31 ENCOUNTER — ANCILLARY PROCEDURE (OUTPATIENT)
Dept: ULTRASOUND IMAGING | Facility: CLINIC | Age: 49
End: 2019-07-31
Attending: NURSE PRACTITIONER
Payer: COMMERCIAL

## 2019-07-31 DIAGNOSIS — R10.32 LLQ ABDOMINAL PAIN: ICD-10-CM

## 2019-07-31 DIAGNOSIS — R10.31 RLQ ABDOMINAL PAIN: ICD-10-CM

## 2019-07-31 DIAGNOSIS — N95.0 POST-MENOPAUSAL BLEEDING: ICD-10-CM

## 2019-07-31 PROCEDURE — 76856 US EXAM PELVIC COMPLETE: CPT | Mod: 59

## 2019-07-31 PROCEDURE — 76830 TRANSVAGINAL US NON-OB: CPT

## 2019-08-05 ENCOUNTER — TELEPHONE (OUTPATIENT)
Dept: FAMILY MEDICINE | Facility: CLINIC | Age: 49
End: 2019-08-05

## 2019-08-05 DIAGNOSIS — N95.0 POST-MENOPAUSAL BLEEDING: Primary | ICD-10-CM

## 2019-08-05 DIAGNOSIS — R93.89 ENDOMETRIAL THICKENING ON ULTRASOUND: ICD-10-CM

## 2019-08-05 DIAGNOSIS — N83.201 CYSTS OF BOTH OVARIES: ICD-10-CM

## 2019-08-05 DIAGNOSIS — N83.202 CYSTS OF BOTH OVARIES: ICD-10-CM

## 2019-08-05 NOTE — TELEPHONE ENCOUNTER
Reason for Call:  Other call back    Detailed comments: Patient is requesting a call back in regards to the referral that was put in for her. She says that there was not a lot of information given to her, only that she was referred to an OB based on her US results and wants to know if this is something she can wait on or if she needs to be seen with an OB right away? Patient also submitted a Nadanu message but says she still has not heard back. She seemed anxious and would like to know the reason for the referral and what next steps she should expect. Please give patient a call at your earliest convenience. Thank you.    Phone Number Patient can be reached at: Home number on file [phone] (home)    Best Time: Anytime.    Can we leave a detailed message on this number? YES    Call taken on 8/5/2019 at 5:13 PM by Christiano Rosado

## 2019-08-05 NOTE — RESULT ENCOUNTER NOTE
Sudhir Iqbal,    Thank you for your recent office visit.    Here are your recent results.  The pelvic ultrasound did show that your uterine lining is too thick for a post menopausal woman.  It also showed two ovarian cysts. Due to the first finding, I am referring you to OBGYN for further evaluation and treatment. Please schedule with them at this time.   Feel free to contact me via Vocab or call the clinic at [phone].    Sincerely,    ANJU Coronado, FNP-BC

## 2019-08-06 NOTE — TELEPHONE ENCOUNTER
Tried calling patient. There was no answer. Left a message for her to call back if she has any further questions.   Noted recent office visit with Josi Guajardo- postmenopausal bleeding. Pelvic US revealed Endometrial thickening. Referral to OBGYN for further evaluation, Endometrial biopsy. I see an appointment scheduled tomorrow with OBGYN.  I agree with the workup done. Let us know if she has any further questions.

## 2019-08-06 NOTE — TELEPHONE ENCOUNTER
"Patient called very up set.  Stating she was very frustrated the way she received her results and not being told if this is urgent or not. Diagnosis for referral   N95.0 (ICD-10-CM) - Post-menopausal bleeding   R93.89 (ICD-10-CM) - Endometrial thickening on ultrasound   N83.201, N83.202 (ICD-10-CM) - Cysts of both ovaries     Patient was told she was referred to OB to discuss this further.  Tried to explain to patient that referrals are made for her to discuss this with the specialist so that she can get all the needed information to know what the next step would be in treatment if needed.  Patient very upset stating she had to do all the calling to get an appointment with OB.  Offered to send message to providers colleagues (ordering provider out of office)  to help better explain for patient.  Patient refused saying that \"you guys our noncompassionate and that she is going to report us\"  Patient stated nurse (me) was being rude.  Nurse was just trying to explain as nicely as possible but believe patient was already too frustrated and not effectively listening.  "

## 2019-08-07 ENCOUNTER — OFFICE VISIT (OUTPATIENT)
Dept: OBGYN | Facility: CLINIC | Age: 49
End: 2019-08-07
Payer: COMMERCIAL

## 2019-08-07 VITALS
HEART RATE: 70 BPM | BODY MASS INDEX: 37.35 KG/M2 | WEIGHT: 221 LBS | SYSTOLIC BLOOD PRESSURE: 131 MMHG | DIASTOLIC BLOOD PRESSURE: 73 MMHG | TEMPERATURE: 98.7 F

## 2019-08-07 DIAGNOSIS — N95.0 POST-MENOPAUSAL BLEEDING: Primary | ICD-10-CM

## 2019-08-07 DIAGNOSIS — R93.89 THICKENED ENDOMETRIUM: ICD-10-CM

## 2019-08-07 DIAGNOSIS — N83.209 SIMPLE OVARIAN CYST: ICD-10-CM

## 2019-08-07 LAB — HCG UR QL: NEGATIVE

## 2019-08-07 PROCEDURE — 88305 TISSUE EXAM BY PATHOLOGIST: CPT | Performed by: OBSTETRICS & GYNECOLOGY

## 2019-08-07 PROCEDURE — 58100 BIOPSY OF UTERUS LINING: CPT | Performed by: OBSTETRICS & GYNECOLOGY

## 2019-08-07 PROCEDURE — 81025 URINE PREGNANCY TEST: CPT | Performed by: OBSTETRICS & GYNECOLOGY

## 2019-08-07 PROCEDURE — 99203 OFFICE O/P NEW LOW 30 MIN: CPT | Mod: 25 | Performed by: OBSTETRICS & GYNECOLOGY

## 2019-08-07 PROCEDURE — 88305 TISSUE EXAM BY PATHOLOGIST: CPT | Mod: 26 | Performed by: OBSTETRICS & GYNECOLOGY

## 2019-08-07 NOTE — PROGRESS NOTES
GYN Clinic Note  Date: 2019    CC:  Postmenopausal Bleeding    HPI:  Farida Toth is a 48 year old female  presents for evaluation of postmenopausal bleeding in consultation from Josi Guajardo NP.       Had an episode of vaginal bleeding on 2019.  Very heavy bleeding.  Passed clots that she could feel pass.  Bleeding through clothes.  Slept on towel as she was bleeding through pads.    Prior to that, reports she had not had a period in 5 to 8 years.    History of irregular periods, frequent, heavy.  But this peisode was far worse than what she had before.    Called insurance as she was on a blood thinner, Eliquis, due to A fib.  Has since had a cardioversion and stopped the Eliquis about 1.5 weeks ago. Both insurance and cardiologist told her that this vaginal bleeding episode likely wasn't related to the Eliquis.    Her work-up thus far for her abnormal bleeding has included:  - TSH: 1.87 in 2019  - transvaginal ultrasound: 2019, stripe of 1.1 cm.  - Hgb: 13.8 in 2019     GYN HISTORY:  No LMP recorded. Patient is postmenopausal.    Menarche: 11  Menopause: early 40s  STI history: No STD history  Last Pap: 2019  History of abnormal pap: most recent pap was ASCUS, HPV neg  History of cervical procedures: denies     Patient has following risk factors for endometrial cancer:  Unopposed estrogen exposure: No  Obesity: Yes. Details:   Body mass index is 37.35 kg/m .   Smoking: No  Nulliparity: No  Infertility: No  Early age of menarche / late age of menopause: No  Family history of colon cancer, ovarian cancer, and type I endometrial cancer: Mom had colon cancer at age 58,  at age 59.  Had hysterectomy in late 30s due to bleeding?      OBSTETRIC HISTORY:   OB History    Para Term  AB Living   2 0 0 0 1 1   SAB TAB Ectopic Multiple Live Births   1 0 0 0 0      # Outcome Date GA Lbr Flako/2nd Weight Sex Delivery Anes PTL Lv   2             1 SAB                 Past Medical History:   Diagnosis Date     Abnormal Pap smear of cervix 2019    See problem list     Atrial fibrillation (H)      Diverticula of colon      Family history of colon cancer in mother      Family history of colon cancer in mother      Uterine fibroid        Past Surgical History:   Procedure Laterality Date     ANESTHESIA CARDIOVERSION N/A 2019    Procedure: Anesthesia Coverage Cardioversion @1130;  Surgeon: GENERIC ANESTHESIA PROVIDER;  Location: [facility] OR     COLONOSCOPY       NO HISTORY OF SURGERY       Social History     Tobacco Use     Smoking status: Never Smoker     Smokeless tobacco: Never Used   Substance Use Topics     Alcohol use: Never     Frequency: Never     Drug use: Never         Family History   Problem Relation Age of Onset     Colon Cancer Mother         Dx at age 58 and  at age 59 with mets     Hypertension Mother      Stomach Cancer Father      Cerebrovascular Disease Father      Other Cancer Father         Skin Cancer/ from Stomach cancer     Obesity Father      Breast Cancer Sister         Dx in her 40s     Obesity Sister      Obesity Brother      Obesity Brother        No current outpatient medications on file.       Allergies: Patient has no known allergies.    ROS:  C: NEGATIVE for fever, chills, change in weight  I: NEGATIVE for worrisome rashes, moles or lesions  E: NEGATIVE for vision changes or irritation  E/M: NEGATIVE for ear, mouth and throat problems  R: NEGATIVE for significant cough or SOB  CV: NEGATIVE for chest pain, palpitations or peripheral edema  GI: NEGATIVE for nausea, abdominal pain, heartburn, or change in bowel habits  : pos for post-menopausal bleeding.  NEGATIVE for frequency, dysuria, hematuria, vaginal discharge  M: NEGATIVE for significant arthralgias or myalgia  N: NEGATIVE for weakness, dizziness or paresthesias  E: NEGATIVE for temperature intolerance, skin/hair changes  P: NEGATIVE for changes in mood or  affect    EXAM:  Blood pressure 131/73, pulse 70, temperature 98.7  F (37.1  C), temperature source Oral, weight 100.2 kg (221 lb), not currently breastfeeding.   BMI= Body mass index is 37.35 kg/m .  General - pleasant female in no acute distress.  Abdomen - soft, nontender, nondistended, no hepatosplenomegaly.  Pelvic - external genitalia: normal adult female without lesions or abnormalities; BUS: within normal limits; Vagina: well rugated, no discharge, no lesions; Cervix: no lesions or CMT; Uterus: anteverted, 8 week sized, mobile and nontender; Adnexae: no masses or tenderness.  Rectovaginal - deferred.  Musculoskeletal - no gross deformities.  Neurological - normal strength, sensation, and mental status.  -------------------  ULTRASOUND PELVIC COMPLETE WITH TRANSVAGINAL IMAGING  7/31/2019 5:20  PM     HISTORY: Pain in right lower quadrant and left lower quadrant. Four  days of postmenopausal bleeding.      TECHNIQUE: Transabdominal images of the pelvis are supplemented with  endovaginal images to better define anatomy.     COMPARISON: None.     FINDINGS: The uterus measures 9.3 x 4.4 x 5.9 cm. There is a 1.5 cm  intramural fibroid in the mid posterior uterine body. Endometrial  stripe thickness is 1.1 cm. This is abnormal in a postmenopausal  female with bleeding and endometrial pathology is suspected. There are  also multiple tiny cystic foci within the endometrium of uncertain  etiology.     There is a 1 cm paraovarian cyst on the right.  The right ovary is  otherwise unremarkable. The left ovary shows a 2.1 x 1.8 x 1.6 cm  simple cyst. No free fluid.                                                                      IMPRESSION:   1. Abnormally thickened endometrial stripe for postmenopausal female  with bleeding. Endometrial pathology should be suspected.  2. Small bilateral ovarian cysts as described above.     PHUONG MEREDITH MD  ------------------  Procedure: Endometrial biopsy.   After informed  consent was obtained, the cervix was cleansed with betadyne, grasped with a tenaculum.  The pipel was inserted easily and four quadrant sampling was done.  Sample was sent for pathology.    Tenaculum removed, hemostasis achieved with simple pressure, minimal bleeding. Patient tolerated procedure well.  ---------------    ASSESSMENT:  Farida Toth is a 48 year old  who presents with postmenopausal bleeding. Discussed differential diagnosis: disordered proliferative endometrium (normal pathology), hyperplasia, malignancy     PLAN:  1. Post-menopausal bleeding with thickened endometrium.  - ENDOMETRIAL BIOPSY W/O CERVICAL DILATION  - Surgical pathology exam    Discussed postmenopausal bleeding and possible causes   Endometrial biopsy was done today to rule out hyperplasia and malignancy.  Transvaginal ultrasound: completed prior to today's appointment   Cervical cancer screening: up to date.    2.  Simple ovarian cyst  Consider repeat US in 6-8w for ovarian cyst.  Patient would like to wait to get biopsy results back before scheduling US.    Aware that if results show abnormality like cancer, she would be referred to a specialist.      Sandie Michel MD

## 2019-08-07 NOTE — NURSING NOTE
"Chief Complaint   Patient presents with     Biopsy     ENDOMETRIAL       Initial /73 (BP Location: Left arm, Patient Position: Sitting, Cuff Size: Adult Large)   Pulse 70   Temp 98.7  F (37.1  C) (Oral)   Wt 100.2 kg (221 lb)   BMI 37.35 kg/m   Estimated body mass index is 37.35 kg/m  as calculated from the following:    Height as of 19: 1.638 m (5' 4.5\").    Weight as of this encounter: 100.2 kg (221 lb).  BP completed using cuff size: large    Questioned patient about current smoking habits.  Pt. has never smoked.          The following HM Due: mammogram      The following patient reported/Care Every where data was sent to:  P ABSTRACT QUALITY INITIATIVES [20720]  ARLENE Gonzalez           "

## 2019-08-12 LAB — COPATH REPORT: NORMAL

## 2019-11-10 ENCOUNTER — APPOINTMENT (OUTPATIENT)
Dept: GENERAL RADIOLOGY | Facility: CLINIC | Age: 49
End: 2019-11-10
Attending: FAMILY MEDICINE
Payer: COMMERCIAL

## 2019-11-10 ENCOUNTER — HOSPITAL ENCOUNTER (EMERGENCY)
Facility: CLINIC | Age: 49
Discharge: HOME | End: 2019-11-10
Attending: FAMILY MEDICINE | Admitting: FAMILY MEDICINE
Payer: COMMERCIAL

## 2019-11-10 VITALS
HEART RATE: 79 BPM | RESPIRATION RATE: 10 BRPM | DIASTOLIC BLOOD PRESSURE: 76 MMHG | OXYGEN SATURATION: 98 % | SYSTOLIC BLOOD PRESSURE: 123 MMHG

## 2019-11-10 DIAGNOSIS — K52.9 GASTROENTERITIS: ICD-10-CM

## 2019-11-10 LAB
ALBUMIN SERPL-MCNC: 4.2 G/DL (ref 3.4–5)
ALP SERPL-CCNC: 103 U/L (ref 40–150)
ALT SERPL W P-5'-P-CCNC: 36 U/L (ref 0–50)
ANION GAP SERPL CALCULATED.3IONS-SCNC: 6 MMOL/L (ref 3–14)
AST SERPL W P-5'-P-CCNC: 21 U/L (ref 0–45)
BASOPHILS # BLD AUTO: 0 10E9/L (ref 0–0.2)
BASOPHILS NFR BLD AUTO: 0.2 %
BILIRUB SERPL-MCNC: 0.7 MG/DL (ref 0.2–1.3)
BUN SERPL-MCNC: 23 MG/DL (ref 7–30)
CALCIUM SERPL-MCNC: 9.1 MG/DL (ref 8.5–10.1)
CHLORIDE SERPL-SCNC: 110 MMOL/L (ref 94–109)
CO2 SERPL-SCNC: 24 MMOL/L (ref 20–32)
CREAT SERPL-MCNC: 0.83 MG/DL (ref 0.52–1.04)
DIFFERENTIAL METHOD BLD: ABNORMAL
EOSINOPHIL # BLD AUTO: 0 10E9/L (ref 0–0.7)
EOSINOPHIL NFR BLD AUTO: 0.2 %
ERYTHROCYTE [DISTWIDTH] IN BLOOD BY AUTOMATED COUNT: 12.5 % (ref 10–15)
GFR SERPL CREATININE-BSD FRML MDRD: 83 ML/MIN/{1.73_M2}
GLUCOSE SERPL-MCNC: 157 MG/DL (ref 70–99)
HCT VFR BLD AUTO: 47.2 % (ref 35–47)
HGB BLD-MCNC: 15.3 G/DL (ref 11.7–15.7)
IMM GRANULOCYTES # BLD: 0 10E9/L (ref 0–0.4)
IMM GRANULOCYTES NFR BLD: 0.3 %
LYMPHOCYTES # BLD AUTO: 0.5 10E9/L (ref 0.8–5.3)
LYMPHOCYTES NFR BLD AUTO: 4.3 %
MCH RBC QN AUTO: 29.5 PG (ref 26.5–33)
MCHC RBC AUTO-ENTMCNC: 32.4 G/DL (ref 31.5–36.5)
MCV RBC AUTO: 91 FL (ref 78–100)
MONOCYTES # BLD AUTO: 0.2 10E9/L (ref 0–1.3)
MONOCYTES NFR BLD AUTO: 2.1 %
NEUTROPHILS # BLD AUTO: 10 10E9/L (ref 1.6–8.3)
NEUTROPHILS NFR BLD AUTO: 92.9 %
NRBC # BLD AUTO: 0 10*3/UL
NRBC BLD AUTO-RTO: 0 /100
PLATELET # BLD AUTO: 301 10E9/L (ref 150–450)
POTASSIUM SERPL-SCNC: 3.9 MMOL/L (ref 3.4–5.3)
PROT SERPL-MCNC: 8.4 G/DL (ref 6.8–8.8)
RBC # BLD AUTO: 5.19 10E12/L (ref 3.8–5.2)
SODIUM SERPL-SCNC: 140 MMOL/L (ref 133–144)
TROPONIN I SERPL-MCNC: <0.015 UG/L (ref 0–0.04)
WBC # BLD AUTO: 10.8 10E9/L (ref 4–11)

## 2019-11-10 PROCEDURE — 96361 HYDRATE IV INFUSION ADD-ON: CPT | Performed by: FAMILY MEDICINE

## 2019-11-10 PROCEDURE — 99285 EMERGENCY DEPT VISIT HI MDM: CPT | Mod: 25 | Performed by: FAMILY MEDICINE

## 2019-11-10 PROCEDURE — 25000132 ZZH RX MED GY IP 250 OP 250 PS 637: Performed by: FAMILY MEDICINE

## 2019-11-10 PROCEDURE — 71046 X-RAY EXAM CHEST 2 VIEWS: CPT

## 2019-11-10 PROCEDURE — 25800030 ZZH RX IP 258 OP 636: Performed by: FAMILY MEDICINE

## 2019-11-10 PROCEDURE — 25000128 H RX IP 250 OP 636: Performed by: FAMILY MEDICINE

## 2019-11-10 PROCEDURE — 80053 COMPREHEN METABOLIC PANEL: CPT | Performed by: FAMILY MEDICINE

## 2019-11-10 PROCEDURE — 96375 TX/PRO/DX INJ NEW DRUG ADDON: CPT | Performed by: FAMILY MEDICINE

## 2019-11-10 PROCEDURE — 96374 THER/PROPH/DIAG INJ IV PUSH: CPT | Performed by: FAMILY MEDICINE

## 2019-11-10 PROCEDURE — C9113 INJ PANTOPRAZOLE SODIUM, VIA: HCPCS | Performed by: FAMILY MEDICINE

## 2019-11-10 PROCEDURE — 25000125 ZZHC RX 250: Performed by: FAMILY MEDICINE

## 2019-11-10 PROCEDURE — 93005 ELECTROCARDIOGRAM TRACING: CPT | Performed by: FAMILY MEDICINE

## 2019-11-10 PROCEDURE — 93010 ELECTROCARDIOGRAM REPORT: CPT | Mod: Z6 | Performed by: FAMILY MEDICINE

## 2019-11-10 PROCEDURE — 85025 COMPLETE CBC W/AUTO DIFF WBC: CPT | Performed by: FAMILY MEDICINE

## 2019-11-10 PROCEDURE — 84484 ASSAY OF TROPONIN QUANT: CPT | Performed by: FAMILY MEDICINE

## 2019-11-10 RX ORDER — ONDANSETRON 2 MG/ML
4 INJECTION INTRAMUSCULAR; INTRAVENOUS EVERY 30 MIN PRN
Status: DISCONTINUED | OUTPATIENT
Start: 2019-11-10 | End: 2019-11-10

## 2019-11-10 RX ORDER — ONDANSETRON 2 MG/ML
8 INJECTION INTRAMUSCULAR; INTRAVENOUS ONCE
Status: COMPLETED | OUTPATIENT
Start: 2019-11-10 | End: 2019-11-10

## 2019-11-10 RX ORDER — ONDANSETRON 4 MG/1
4-8 TABLET, ORALLY DISINTEGRATING ORAL EVERY 8 HOURS PRN
Qty: 12 TABLET | Refills: 0 | Status: SHIPPED | OUTPATIENT
Start: 2019-11-10 | End: 2019-11-13

## 2019-11-10 RX ORDER — SODIUM CHLORIDE 9 MG/ML
1000 INJECTION, SOLUTION INTRAVENOUS CONTINUOUS
Status: DISCONTINUED | OUTPATIENT
Start: 2019-11-10 | End: 2019-11-10 | Stop reason: HOSPADM

## 2019-11-10 RX ADMIN — PANTOPRAZOLE SODIUM 40 MG: 40 INJECTION, POWDER, FOR SOLUTION INTRAVENOUS at 13:56

## 2019-11-10 RX ADMIN — LIDOCAINE HYDROCHLORIDE 30 ML: 20 SOLUTION ORAL; TOPICAL at 14:51

## 2019-11-10 RX ADMIN — ONDANSETRON 8 MG: 2 INJECTION INTRAMUSCULAR; INTRAVENOUS at 13:52

## 2019-11-10 RX ADMIN — SODIUM CHLORIDE 1000 ML: 9 INJECTION, SOLUTION INTRAVENOUS at 13:51

## 2019-11-10 NOTE — DISCHARGE INSTRUCTIONS
Return to the Emergency Room if the following occurs:     Severely worsened pain, worsened breathing, dehydration, or for any concern at anytime.    Or, follow-up with the following provider as we discussed:     Return to your primary doctor as needed, or if not improved over the next 5 days.    Medications discussed:    Zofran for nausea, as needed.    If you received pain-relieving or sedating medication during your time in the ER, avoid alcohol, driving automobiles, or working with machinery.  Also, a responsible adult must stay with you.        Call the Nurse Advice Line at [phone] or [phone] for any concern at anytime.

## 2019-11-10 NOTE — ED AVS SNAPSHOT
[facility] Emergency Department  [address] [address]  Phone:  [phone]  Fax:  [phone]                                    Farida Toth   MRN: [id number]    Department:  [facility] Emergency Department   Date of Visit:  11/10/2019           After Visit Summary Signature Page    I have received my discharge instructions, and my questions have been answered. I have discussed any challenges I see with this plan with the nurse or doctor.    ..........................................................................................................................................  Patient/Patient Representative Signature      ..........................................................................................................................................  Patient Representative Print Name and Relationship to Patient    ..................................................               ................................................  Date                                   Time    ..........................................................................................................................................  Reviewed by Signature/Title    ...................................................              ..............................................  Date                                               Time          22EPIC Rev 08/18

## 2019-11-10 NOTE — ED PROVIDER NOTES
HPI   The patient is a 49-year-old female presenting with nausea, vomiting, diarrhea, and chest pain.  Symptoms began abruptly at about 2:00 AM.  She awoke from sleep with nausea and abdominal discomfort.  She proceeded to throw up multiple times through the night.  She also had multiple episodes of diarrhea.  No hematemesis or coffee-ground emesis.  No hematochezia or melena.  Starting at about 5:00 AM she recognized central chest pain that would radiate to her mid back.  This is been constant since onset.  It waxes and wanes without obvious cause.  Her current level of pain is moderate though it has been severe.  She denies abdominal pain currently.  She has been dyspneic occasionally through the night.  She was on a hike yesterday and had no issues with exertion.  She denies other sick contacts.  She denies obviously tainted food.  No recent travel.  No recent antibiotics.  No fever.        Allergies:  No Known Allergies  Problem List:    Patient Active Problem List    Diagnosis Date Noted     Post-menopausal bleeding 07/30/2019     Priority: Medium     ASCUS of cervix with negative high risk HPV 05/07/2019     Priority: Medium     1991, 1995, 2006, 2010, 2012 NIL paps.   5/7/19 ASCUS pap with Neg HPV. Plan cotest in 3 years.        Atrial fibrillation, unspecified type (H) 04/29/2019     Priority: Medium     Family history of colon cancer in mother      Priority: Medium      Past Medical History:    Past Medical History:   Diagnosis Date     Abnormal Pap smear of cervix 05/07/2019     Atrial fibrillation (H)      Diverticula of colon      Family history of colon cancer in mother      Family history of colon cancer in mother      Uterine fibroid      Past Surgical History:    Past Surgical History:   Procedure Laterality Date     ANESTHESIA CARDIOVERSION N/A 5/13/2019    Procedure: Anesthesia Coverage Cardioversion @1130;  Surgeon: GENERIC ANESTHESIA PROVIDER;  Location: [facility] OR     COLONOSCOPY       MYOMECTOMY  UTERUS  2011    hysteroscopic myomectomy     NO HISTORY OF SURGERY       Family History:    Family History   Problem Relation Age of Onset     Colon Cancer Mother         Dx at age 58 and  at age 59 with mets     Hypertension Mother      Other - See Comments Mother         had hysterectomy in her 30s, for fibroids?     Stomach Cancer Father      Cerebrovascular Disease Father      Other Cancer Father         Skin Cancer/ from Stomach cancer     Obesity Father      Breast Cancer Sister         Dx in her 40s     Obesity Sister      Obesity Brother      Obesity Brother      Social History:  Marital Status:  Single [1]  Social History     Tobacco Use     Smoking status: Never Smoker     Smokeless tobacco: Never Used   Substance Use Topics     Alcohol use: Never     Frequency: Never     Drug use: Never      Medications:    ondansetron (ZOFRAN ODT) 4 MG ODT tab      Review of Systems   All other systems reviewed and are negative.      PE   BP: (!) 138/102  Pulse: 118  Heart Rate: 101  Resp: 16  SpO2: 99 %  Physical Exam  Vitals signs reviewed.   Constitutional:       General: She is in acute distress.      Appearance: She is well-developed.      Comments: Obviously uncomfortable.  Cooperative though and answering questions well.   HENT:      Head: Normocephalic and atraumatic.   Eyes:      Extraocular Movements: Extraocular movements intact.      Conjunctiva/sclera: Conjunctivae normal.      Pupils: Pupils are equal, round, and reactive to light.   Neck:      Musculoskeletal: Normal range of motion.   Cardiovascular:      Rate and Rhythm: Regular rhythm. Tachycardia present.      Heart sounds: Normal heart sounds.   Pulmonary:      Effort: Pulmonary effort is normal.   Abdominal:      Palpations: Abdomen is soft.      Tenderness: There is no tenderness.      Comments: Epigastric tenderness.  Soft throughout.  No organomegaly or mass.   Musculoskeletal: Normal range of motion.   Skin:     General: Skin is warm and  dry.   Neurological:      Mental Status: She is alert and oriented to person, place, and time.   Psychiatric:         Behavior: Behavior normal.         ED COURSE and MDM   1343.  The patient has nausea with vomiting and diarrhea since about 2:00 AM this morning.  She has had midline chest and back discomfort since about 5:00 AM.  Her EKG is documented below and unremarkable.  Lab values pending.  GI cocktail, Protonix, Zofran, fluid bolus ordered.  Lab values pending.  Chest x-ray.    1515.  Her work-up is unremarkable.  For acute coronary obstruction or myocardial infarction or myocarditis.  No evidence for air within the mediastinum or esophageal rupture.  The patient feels much better and would like to go home.  Zofran prescription provided.  Follow-up discussed.  Gastroenteritis likely.    EKG  (1344)   Interpretation performed by me.  Rate: 83     Rhythm: sinus     Axis: nl  Intervals: OK (12-2) 126, QRS (<12) 98, QTc (>5) 408  P wave: nl     QRS complex: nl  ST segment / T-wave: nl  Conclusion: nl    LABS  Labs Ordered and Resulted from Time of ED Arrival Up to the Time of Departure from the ED   CBC WITH PLATELETS DIFFERENTIAL - Abnormal; Notable for the following components:       Result Value    Hematocrit 47.2 (*)     Absolute Neutrophil 10.0 (*)     Absolute Lymphocytes 0.5 (*)     All other components within normal limits   COMPREHENSIVE METABOLIC PANEL - Abnormal; Notable for the following components:    Chloride 110 (*)     Glucose 157 (*)     All other components within normal limits   TROPONIN I   PERIPHERAL IV CATHETER       IMAGING  Images reviewed by me.  Radiology report also reviewed.  XR Chest 2 Views   Preliminary Result   IMPRESSION: No evidence of acute cardiopulmonary disease is seen.          Procedures    Medications   0.9% sodium chloride BOLUS (0 mLs Intravenous Stopped 11/10/19 1450)     Followed by   sodium chloride 0.9% infusion (has no administration in time range)   ondansetron  (ZOFRAN) injection 8 mg (8 mg Intravenous Given 11/10/19 1352)   pantoprazole (PROTONIX) 40 mg IV push injection (40 mg Intravenous Given 11/10/19 1356)   lidocaine (XYLOCAINE) 2 % 15 mL, alum & mag hydroxide-simethicone (MYLANTA ES/MAALOX  ES) 15 mL GI Cocktail (30 mLs Oral Given 11/10/19 1451)         IMPRESSION       ICD-10-CM    1. Gastroenteritis K52.9             Medication List      Started    ondansetron 4 MG ODT tab  Commonly known as:  ZOFRAN ODT  4-8 mg, Oral, EVERY 8 HOURS PRN                          Tom Fajardo MD  11/10/19 8935

## 2019-11-10 NOTE — ED NOTES
Pt c/o vomiting and diarrhea mult times since 2 am and a few hours later developed cp that radiates through to the back and into shoulder blades.  Hot/chills.  No soa.

## 2020-03-11 ENCOUNTER — HEALTH MAINTENANCE LETTER (OUTPATIENT)
Age: 50
End: 2020-03-11

## 2021-01-03 ENCOUNTER — HEALTH MAINTENANCE LETTER (OUTPATIENT)
Age: 51
End: 2021-01-03

## 2021-03-07 ENCOUNTER — HEALTH MAINTENANCE LETTER (OUTPATIENT)
Age: 51
End: 2021-03-07

## 2021-04-16 ENCOUNTER — IMMUNIZATION (OUTPATIENT)
Dept: NURSING | Facility: CLINIC | Age: 51
End: 2021-04-16
Payer: COMMERCIAL

## 2021-04-16 PROCEDURE — 91300 PR COVID VAC PFIZER DIL RECON 30 MCG/0.3 ML IM: CPT

## 2021-04-16 PROCEDURE — 0001A PR COVID VAC PFIZER DIL RECON 30 MCG/0.3 ML IM: CPT

## 2021-04-19 ASSESSMENT — ENCOUNTER SYMPTOMS
DYSURIA: 0
BREAST MASS: 0
EYE PAIN: 0
HEADACHES: 0
ABDOMINAL PAIN: 0
NAUSEA: 0
JOINT SWELLING: 0
DIZZINESS: 0
SHORTNESS OF BREATH: 0
PALPITATIONS: 0
SORE THROAT: 0
WEAKNESS: 0
DIARRHEA: 0
FEVER: 0
COUGH: 0
HEMATURIA: 0
CONSTIPATION: 0
CHILLS: 0
HEMATOCHEZIA: 0
HEARTBURN: 0
PARESTHESIAS: 0
MYALGIAS: 0
FREQUENCY: 0
NERVOUS/ANXIOUS: 0
ARTHRALGIAS: 0

## 2021-04-21 ENCOUNTER — OFFICE VISIT (OUTPATIENT)
Dept: FAMILY MEDICINE | Facility: CLINIC | Age: 51
End: 2021-04-21
Payer: COMMERCIAL

## 2021-04-21 VITALS
OXYGEN SATURATION: 97 % | WEIGHT: 219.4 LBS | HEART RATE: 81 BPM | DIASTOLIC BLOOD PRESSURE: 74 MMHG | TEMPERATURE: 97.6 F | SYSTOLIC BLOOD PRESSURE: 124 MMHG | BODY MASS INDEX: 36.55 KG/M2 | RESPIRATION RATE: 16 BRPM | HEIGHT: 65 IN

## 2021-04-21 DIAGNOSIS — Z12.11 SCREENING FOR COLON CANCER: ICD-10-CM

## 2021-04-21 DIAGNOSIS — G47.00 INSOMNIA, UNSPECIFIED TYPE: ICD-10-CM

## 2021-04-21 DIAGNOSIS — R63.5 WEIGHT GAIN: ICD-10-CM

## 2021-04-21 DIAGNOSIS — Z13.1 ENCOUNTER FOR SCREENING EXAMINATION FOR IMPAIRED GLUCOSE REGULATION AND DIABETES MELLITUS: ICD-10-CM

## 2021-04-21 DIAGNOSIS — Z00.00 ROUTINE GENERAL MEDICAL EXAMINATION AT A HEALTH CARE FACILITY: Primary | ICD-10-CM

## 2021-04-21 DIAGNOSIS — Z12.31 ENCOUNTER FOR SCREENING MAMMOGRAM FOR BREAST CANCER: ICD-10-CM

## 2021-04-21 DIAGNOSIS — N95.1 MENOPAUSAL SYNDROME (HOT FLASHES): ICD-10-CM

## 2021-04-21 LAB — GLUCOSE SERPL-MCNC: 93 MG/DL (ref 70–99)

## 2021-04-21 PROCEDURE — 99213 OFFICE O/P EST LOW 20 MIN: CPT | Mod: 25 | Performed by: FAMILY MEDICINE

## 2021-04-21 PROCEDURE — 99396 PREV VISIT EST AGE 40-64: CPT | Performed by: FAMILY MEDICINE

## 2021-04-21 PROCEDURE — 36415 COLL VENOUS BLD VENIPUNCTURE: CPT | Performed by: FAMILY MEDICINE

## 2021-04-21 PROCEDURE — 82947 ASSAY GLUCOSE BLOOD QUANT: CPT | Performed by: FAMILY MEDICINE

## 2021-04-21 RX ORDER — MULTIPLE VITAMINS W/ MINERALS TAB 9MG-400MCG
1 TAB ORAL DAILY
COMMUNITY

## 2021-04-21 ASSESSMENT — ENCOUNTER SYMPTOMS
PARESTHESIAS: 0
BREAST MASS: 0
HEMATOCHEZIA: 0
ABDOMINAL PAIN: 0
DIARRHEA: 0
HEARTBURN: 0
DIZZINESS: 0
DYSURIA: 0
WEAKNESS: 0
PALPITATIONS: 0
NERVOUS/ANXIOUS: 0
JOINT SWELLING: 0
FREQUENCY: 0
CHILLS: 0
MYALGIAS: 0
ARTHRALGIAS: 0
COUGH: 0
HEMATURIA: 0
HEADACHES: 0
SHORTNESS OF BREATH: 0
CONSTIPATION: 0
NAUSEA: 0
EYE PAIN: 0
SORE THROAT: 0
FEVER: 0

## 2021-04-21 ASSESSMENT — MIFFLIN-ST. JEOR: SCORE: 1611.07

## 2021-04-21 NOTE — PATIENT INSTRUCTIONS
1. To lab    Safe to try black cohosh or plant based estrogen supplements over the counter.  Anti-inflammatory diet, usually plant based and lower carbs.  MenoPro VELVET joni      Preventive Health Recommendations  Female Ages 50 - 64    Yearly exam: See your health care provider every year in order to  o Review health changes.   o Discuss preventive care.    o Review your medicines if your doctor has prescribed any.      Get a Pap test every three years (unless you have an abnormal result and your provider advises testing more often).    If you get Pap tests with HPV test, you only need to test every 5 years, unless you have an abnormal result.     You do not need a Pap test if your uterus was removed (hysterectomy) and you have not had cancer.    You should be tested each year for STDs (sexually transmitted diseases) if you're at risk.     Have a mammogram every 1 to 2 years. [phone] to schedule    Have a colonoscopy at age 50, or have a yearly FIT test (stool test). These exams screen for colon cancer.      Have a cholesterol test every 5 years, or more often if advised.    Have a diabetes test (fasting glucose) every three years. If you are at risk for diabetes, you should have this test more often.     If you are at risk for osteoporosis (brittle bone disease), think about having a bone density scan (DEXA).    Shots: Get a flu shot each year. Get a tetanus shot every 10 years.    Nutrition:     Eat at least 5 servings of fruits and vegetables each day.    Eat whole-grain bread, whole-wheat pasta and brown rice instead of white grains and rice.    Get adequate Calcium and Vitamin D.     Lifestyle    Exercise at least 150 minutes a week (30 minutes a day, 5 days a week). This will help you control your weight and prevent disease.    Limit alcohol to one drink per day.    No smoking.     Wear sunscreen to prevent skin cancer.     See your dentist every six months for an exam and cleaning.    See your eye doctor  every 1 to 2 years.      One pound is 3500 calories.  So to lose one pound per week need to cut out 500 calories per day or 3500 calories per week.  Do not count exercise toward or against your calories.  Write down everything that you eat and count calories or use an joni like LinQMart or Lose It or MyFitness Pal or website like [employer] or MyPlate or Calorie Fredi  Kelseazakia is both a CBT  for healthy eating and calorie tracker.  The [place] Diet Solution      Patient Education     MyPlate Worksheet: 1,600 Calories    Your calorie needs are about 1,600 calories a day. Below are the USDA guidelines for your daily recommended amount of each food group.   Vegetables 2 cups Fruits 1  cups Grains 5 ounces Dairy 3 cups Protein 5 ounces   Eat a variety of vegetables each day.  Aim for these amounts each week:    1  cups dark green vegetables    4 cups red or orange-colored vegetables    1 cup dry beans and peas    4 cups starchy vegetables    3  cups other vegetables Eat a variety of fruits each day.  Go easy on fruit juices.  Good choices of fruits include:    Berries    Bananas    Apples    Melon    Dried fruit    Frozen fruit    Canned fruit Choose whole grains whenever you can.  Aim to eat at least 2  ounces of whole grains each day:    Bread    Cereal    Rice    Pasta    Potatoes    Tortillas Choose low-fat or fat-free milk, yogurt, or cheese each day.  Good choices include:    Low-fat or fat-free milk or chocolate milk    Low-fat or fat-free yogurt    Low-fat or fat-free cottage cheese or other reduced-fat cheeses    Calcium-fortified milk alternatives Choose low-fat or lean meats, poultry, fish, and seafood each day.   Vary your protein. Choose more:    Fish and other seafood    Lean low-fat meat and poultry    Eggs    Beans, peas    Tofu    Unsalted nuts and seeds  Choose less high-fat and red meat.   Source: USDA MyPlate, www.choosemyplate.gov  Know your limits on sodium, saturated fat, and added sugars     Your  allowance for saturated fat is 18 grams a day.    Limit the added sugars to 40 grams a day.    Cut back on salt (sodium). Stay under 2,300 mg sodium a day. If you have a health condition such as heart disease or high blood pressure, your doctor will likely tell you to limit sodium to no more than 1,500 mg a day.    Get moving and be active!  Aim for at least 30 minutes of physical activity most days of the week or 150 minutes of moderate exercise a week.   MyPlate servings worksheet: 1,600 calories  This worksheet tells you how many servings you should get each day from each food group, and tells you how much food makes a serving. Use this as a guide as you plan your meals throughout the day. Track your progress daily by writing in what you actually ate.   Food Group  Daily MyPlate Goal What You Ate Today   Vegetables 4 half-cups or 4 servings  One serving is:    cup cut-up raw or cooked vegetables  1 cup raw, leafy vegetables    baked sweet potato    cup vegetable juice  Note: At meals, fill half your plate with vegetables and fruit and eat them first.      Fruits 3 half-cups or 3 servings  One serving is:    cup fresh, frozen, or canned fruit  1 medium piece of fruit  1 cup of berries or melon    cup dried fruit    cup 100% fruit juice  Note: Make most choices fruit instead of juice.      Grains 5 servings or 5 ounces  One serving is:  1 slice bread  1 cup dry cereal    cup cooked rice, pasta, or cereal  1 5-inch tortilla  Note: Choose whole grains for at least half of your servings each day.      Dairy 3 servings or 3 cups  One serving is:  1 cup milk  1  ounces reduced-fat hard cheese  2 ounces processed cheese  1 cup low-fat yogurt  1/3 cup shredded cheese  Note: Choose low-fat or fat-free most often.      Protein 5 servings or 5 ounces  One serving is:  1 ounce cooked lean beef, pork, lamb, or ham  1 ounce cooked chicken or turkey (no skin)  1 ounce cooked fish or shellfish (not fried)  1 egg    cup egg  substitute    ounce nuts or seeds  1 tablespoon peanut or almond butter    cup cooked dry beans or peas    cup tofu  2 tablespoons hummus      [facility] last reviewed this educational content on 6/1/2020 2000-2021 [employer]. All rights reserved. This information is not intended as a substitute for professional medical care. Always follow your healthcare professional's instructions.           Patient Education     Treating Insomnia     Learning to relax before bedtime can improve your sleep.   Good sleeping habits are a key part of treatment. If needed, some medicines may help you sleep better at first. Making healthy lifestyle changes and learning to relax can improve your sleep. Treating insomnia takes commitment. But trust that your efforts will pay off. Be sure to talk with your healthcare provider before taking any medicine.  Healthy lifestyle  Your lifestyle affects your health and your sleep. Here are some healthy habits:    Keep a regular sleep schedule. Go to bed and get up at the same time each day.    Exercise regularly. It may help you reduce stress. Avoid strenuous exercise for 2 to 4 hours before bedtime.    Avoid or limit naps, especially in the late afternoon.    Use your bed only for sleep and sex.    Don t spend too much time in bed trying to fall asleep. If you can t fall asleep, get up and do something (no electronics) until you become tired and drowsy.    Avoid or limit caffeine and nicotine for up to 6 hours before bedtime. They can keep you awake at night.     Also avoid alcohol for at least 4 to 6 hours before bedtime. It may help you fall asleep at first. But you will have more awakenings during the night. And your sleep will not be restful.  Before bedtime  To sleep better every night, try these tips:    Have a bedtime routine to let your body and mind know when it s time to sleep.    Think of going to bed as relaxing and enjoyable. Sleep will come sooner.    If your  worries don t let you sleep, write them down in a diary. Then close it, and go to bed.    Make sure the room is not too hot or too cold. If it s not dark enough, an eye mask can help. If it s noisy, try using earplugs.    Don't eat a large meal just before bedtime.    Remove noises, bright lights, TVs, cell phones, and computers from your sleeping environment.    Use a comfortable mattress and pillow.  Learn to relax  Stress, anxiety, and body tension may keep you awake at night. To unwind before bedtime, try a warm bath, meditation, or yoga. Also try the following:    Deep breathing. Sit or lie back in a chair. Take a slow, deep breath. Hold it for 5 counts. Then breathe out slowly through your mouth. Keep doing this until you feel relaxed.    Progressive muscle relaxation. Tense and then relax the muscles in your body as you breathe deeply. Start with your feet and work up your body to your neck and face.  Cognitive Behavioral Therapy (CBT)  CBT is the most effective treatment for long-term insomnia. It tries to address the underlying causes of your sleep problems, including your habits and how you think about sleep.  Individual Therapy  Aguilar Brar PsyD, ARUN  Insomnia   [facility]    [facility]: [phone]    [facility]: [phone]  [facility]  Visit [email or web address] or call [phone] to find a clinic close to you.  Suggested resources  The resources below may help you relax. This list is for information only. [facility] is not responsible for the quality of services or the actions of any person or organization. There may be a fee to use some of these resources.  Insomnia treatment books  Damian Mind by Delmi Clarke and Rosanna Stallings (2013)  Overcoming Insomnia by Felix Parker and Delmi Clarke (2008)  Quiet Your Mind and Get to Sleep: Solutions to Insomnia for Those with Depression, Anxiety or  Chronic Pain by Delmi Clarke and Rosanna Stallings (2009)  No More Sleepless Nights by Leonidas Aguila and Barbara Alexander (1996)  Say Damian to Insomnia by Diego Chacon (2009)  The Insomnia Workbook by Farida Eubanks and Alex Villa (2009)  The Insomnia Answer by Dejan Yusuf and Ricardo Coto (2006)  Stress management and relaxation books  The Relaxation and Stress Reduction Workbook by Breanna Grimes, Ivette Henderson and Otto Harper (2008)  Stress Management Workbook: Techniques and Self-Assessment Procedures by Yanique Viramontes and Gavin Liu (1997)  A Mindfulness-Based Stress Reduction Workbook by Chidi Mclean and Harriett Allen (2010)  The Complete Stress Management Workbook by Juan Carlos Mello, Dewayne Dela Cruz and Saroj Friend (1996)  Online programs  www.SHUTi.me (pronounced shut eye). There is a fee for this program.   www.sleepIO.com (pronounced sleep ee oh). There is a fee for this program.  Other online resources  Deep breathing and progressive muscle relaxation (PMR):    http://www.Kiva.AllDigital  Meditation:    www.Advanced Marketing & Media GrouprantheRunteq.AllDigital    www.CirclezonguidediSupplimeditationiSupplisite.com  Guided imagery:    http://www.Howbuy    http://CloudSwitch.AllDigital  (click on  Resource Library,  then choose  Meditation, Relaxation, Guided Imagery )  Apps for your mobile device:    Autogenic Training Progressive Muscle Relaxation by [employer]    Calm: Meditation and Sleep Stories by [employer]k [employer]    Insight Timer - Meditation Flora by [employer].  This is not a prescription and these resources are optional. You must pay for any costs when using these resources. Please ask your insurance carrier if you can be reimbursed for these resources. If so, you are responsible for sending the needed details to your insurance carrier. These resources may also be tax deductible as medical expenses. Check with your .  Date Last Reviewed: 5/18/2018  Clinically reviewed by  Aguilar Barr PsyD, ARUN, [facility], Director of the Insomnia and Behavioral Sleep Health Program, [facility].  For informational purposes only. Not to replace the advice of your health care provider.  Copyright   2018 [facility]. All rights reserved.

## 2021-04-21 NOTE — PROGRESS NOTES
SUBJECTIVE:   CC: Farida Toth is an 50 year old woman who presents for preventive health visit.     Patient has been advised of split billing requirements and indicates understanding: Yes  Healthy Habits:     Getting at least 3 servings of Calcium per day:  Yes    Bi-annual eye exam:  NO    Dental care twice a year:  NO    Sleep apnea or symptoms of sleep apnea:  Daytime drowsiness and Excessive snoring    Diet:  Regular (no restrictions)    Frequency of exercise:  2-3 days/week    Duration of exercise:  Other    Taking medications regularly:  Yes    Barriers to taking medications:  None    Medication side effects:  None    PHQ-2 Total Score: 0    Additional concerns today:  Yes  If time allows:    A-Fib: Patient did eletro cardioversion in 2018 and after those results came back fine patient has been off of all medication. Patient just wanting to make sure she is doing all the right things/ preventive care for this A-fib.    Menopausal sx: ongoing for about 10 years. Getting worse. Hot flashes, can't sleep, matos, dry skin, and black hairs growing on face. Patient wanting to know if there is OTC medication or if there is something she can take for this.  Sleep: trouble falling asleep although exhausted. Is doing good sleep hygeine.  CALM appt  sleepytime tea  Melatonin, not helpful  Tried tylenol PM with opposite effects.  Then up because has to go to the bathroom or hot flashes.     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  Tried WW in past.    Today's PHQ-2 Score:   PHQ-2 ( 1999 Pfizer) 4/19/2021   Q1: Little interest or pleasure in doing things 0   Q2: Feeling down, depressed or hopeless 0   PHQ-2 Score 0   Q1: Little interest or pleasure in doing things Not at all   Q2: Feeling down, depressed or hopeless Not at all   PHQ-2 Score 0       Abuse: Current or Past (Physical, Sexual or Emotional) - No  Do you feel safe in your environment? Yes    Have you ever done Advance Care Planning? (For example, a  Health Directive, POLST, or a discussion with a medical provider or your loved ones about your wishes): No, advance care planning information given to patient to review.  Patient declined advance care planning discussion at this time.    Social History     Tobacco Use     Smoking status: Never Smoker     Smokeless tobacco: Never Used   Substance Use Topics     Alcohol use: Never     Frequency: Never     If you drink alcohol do you typically have >3 drinks per day or >7 drinks per week? No    Alcohol Use 4/19/2021   Prescreen: >3 drinks/day or >7 drinks/week? No   Prescreen: >3 drinks/day or >7 drinks/week? -   No flowsheet data found.    Reviewed orders with patient.  Reviewed health maintenance and updated orders accordingly - Yes  BP Readings from Last 3 Encounters:   04/21/21 (!) 144/74   11/10/19 123/76   08/07/19 131/73    Wt Readings from Last 3 Encounters:   04/21/21 99.5 kg (219 lb 6.4 oz)   08/07/19 100.2 kg (221 lb)   07/30/19 101.1 kg (222 lb 12.8 oz)                    Breast Cancer Screening:    FSH-7:   Breast CA Risk Assessment (FHS-7) 4/19/2021   Did any of your first-degree relatives have breast or ovarian cancer? No   Did any of your relatives have bilateral breast cancer? No   Did any man in your family have breast cancer? No   Did any woman in your family have breast and ovarian cancer? No   Did any woman in your family have breast cancer before age 50 y? No   Do you have 2 or more relatives with breast and/or ovarian cancer? No   Do you have 2 or more relatives with breast and/or bowel cancer? No       Mammogram Screening: Recommended annual mammography  Pertinent mammograms are reviewed under the imaging tab.    History of abnormal Pap smear:   Last 3 Pap Results:   PAP (no units)   Date Value   05/07/2019 ASC-US (A)     PAP / HPV Latest Ref Rng & Units 5/7/2019   PAP - ASC-US(A)   HPV 16 DNA NEG:Negative Negative   HPV 18 DNA NEG:Negative Negative   OTHER HR HPV NEG:Negative Negative  "    Reviewed and updated as needed this visit by clinical staff                 Reviewed and updated as needed this visit by Provider                    Review of Systems   Constitutional: Negative for chills and fever.   HENT: Negative for congestion, ear pain, hearing loss and sore throat.    Eyes: Positive for visual disturbance. Negative for pain.   Respiratory: Negative for cough and shortness of breath.    Cardiovascular: Negative for chest pain, palpitations and peripheral edema.   Gastrointestinal: Negative for abdominal pain, constipation, diarrhea, heartburn, hematochezia and nausea.   Breasts:  Negative for tenderness, breast mass and discharge.   Genitourinary: Negative for dysuria, frequency, genital sores, hematuria, pelvic pain, urgency, vaginal bleeding and vaginal discharge.   Musculoskeletal: Negative for arthralgias, joint swelling and myalgias.   Skin: Negative for rash.   Neurological: Negative for dizziness, weakness, headaches and paresthesias.   Psychiatric/Behavioral: Negative for mood changes. The patient is not nervous/anxious.         OBJECTIVE:   /74   Pulse 81   Temp 97.6  F (36.4  C) (Tympanic)   Resp 16   Ht 1.651 m (5' 5\")   Wt 99.5 kg (219 lb 6.4 oz)   SpO2 97%   BMI 36.51 kg/m    Physical Exam  GENERAL: healthy, alert and no distress  HENT: ear canals and TM's normal, nose and mouth without ulcers or lesions  NECK: no adenopathy, no asymmetry, masses, or scars and thyroid normal to palpation  RESP: lungs clear to auscultation - no rales, rhonchi or wheezes  BREAST: normal without masses, tenderness or nipple discharge and no palpable axillary masses or adenopathy  CV: regular rate and rhythm, normal S1 S2, no S3 or S4, no murmur, click or rub, no peripheral edema and peripheral pulses strong  ABDOMEN: soft, nontender, no hepatosplenomegaly, no masses and bowel sounds normal  MS: no gross musculoskeletal defects noted, no edema  SKIN: SK on left back  PSYCH: mentation " "appears normal, affect normal/bright    Diagnostic Test Results:  Labs reviewed in Epic  Results for orders placed or performed in visit on 21   Glucose     Status: None   Result Value Ref Range    Glucose 93 70 - 99 mg/dL       ASSESSMENT/PLAN:   Farida was seen today for physical, menopausal sx, atrial fib and weight problem.    Diagnoses and all orders for this visit:    Routine general medical examination at a health care facility    Screening for colon cancer: family history in first degree relative for colon cancer.  -     GASTROENTEROLOGY ADULT REF PROCEDURE ONLY; Future    Weight gain: discussed healthy eating, Noom or WW and increase exercise.    Encounter for screening examination for impaired glucose regulation and diabetes mellitus  -     Glucose    Hot Flashes: discussed Mazree joni for lifestyle changes.    Insomnia: discussed sleep hygeine and CBT, sleep is so important for energy and weight.    Encounter for screening mammogram for breast cancer  -     MA Screen Bilateral w/Rad; Future        Patient has been advised of split billing requirements and indicates understanding: Yes  COUNSELING:  Reviewed preventive health counseling, as reflected in patient instructions       Regular exercise       Healthy diet/nutrition       Vision screening       Colon cancer screening       Consider Hep C screening for all patients one time for ages 18-79 years       HIV screeninx in teen years, 1x in adult years, and at intervals if high risk    Estimated body mass index is 36.51 kg/m  as calculated from the following:    Height as of this encounter: 1.651 m (5' 5\").    Weight as of this encounter: 99.5 kg (219 lb 6.4 oz).    Weight management plan: Discussed healthy diet and exercise guidelines  Wt Readings from Last 4 Encounters:   21 99.5 kg (219 lb 6.4 oz)   19 100.2 kg (221 lb)   19 101.1 kg (222 lb 12.8 oz)   19 99.8 kg (220 lb)       She reports that she has never smoked. " She has never used smokeless tobacco.      Counseling Resources:  ATP IV Guidelines  Pooled Cohorts Equation Calculator  Breast Cancer Risk Calculator  BRCA-Related Cancer Risk Assessment: FHS-7 Tool  FRAX Risk Assessment  ICSI Preventive Guidelines  Dietary Guidelines for Americans, 2010  USDA's MyPlate  ASA Prophylaxis  Lung CA Screening    Caesar Villarreal MD  [facility]

## 2021-05-07 ENCOUNTER — IMMUNIZATION (OUTPATIENT)
Dept: NURSING | Facility: CLINIC | Age: 51
End: 2021-05-07
Attending: INTERNAL MEDICINE
Payer: COMMERCIAL

## 2021-05-07 PROCEDURE — 91300 PR COVID VAC PFIZER DIL RECON 30 MCG/0.3 ML IM: CPT

## 2021-05-07 PROCEDURE — 0002A PR COVID VAC PFIZER DIL RECON 30 MCG/0.3 ML IM: CPT

## 2021-05-11 DIAGNOSIS — Z11.59 ENCOUNTER FOR SCREENING FOR OTHER VIRAL DISEASES: ICD-10-CM

## 2021-05-21 DIAGNOSIS — Z11.59 ENCOUNTER FOR SCREENING FOR OTHER VIRAL DISEASES: ICD-10-CM

## 2021-05-21 PROCEDURE — U0003 INFECTIOUS AGENT DETECTION BY NUCLEIC ACID (DNA OR RNA); SEVERE ACUTE RESPIRATORY SYNDROME CORONAVIRUS 2 (SARS-COV-2) (CORONAVIRUS DISEASE [COVID-19]), AMPLIFIED PROBE TECHNIQUE, MAKING USE OF HIGH THROUGHPUT TECHNOLOGIES AS DESCRIBED BY CMS-2020-01-R: HCPCS | Performed by: SURGERY

## 2021-05-21 PROCEDURE — U0005 INFEC AGEN DETEC AMPLI PROBE: HCPCS | Performed by: SURGERY

## 2021-05-22 LAB
SARS-COV-2 RNA RESP QL NAA+PROBE: NOT DETECTED
SPECIMEN SOURCE: NORMAL

## 2021-05-24 ENCOUNTER — ANESTHESIA EVENT (OUTPATIENT)
Dept: GASTROENTEROLOGY | Facility: CLINIC | Age: 51
End: 2021-05-24
Payer: COMMERCIAL

## 2021-05-24 ASSESSMENT — ENCOUNTER SYMPTOMS: DYSRHYTHMIAS: 1

## 2021-05-24 NOTE — ANESTHESIA PREPROCEDURE EVALUATION
Anesthesia Pre-Procedure Evaluation    Patient: Farida Toth   MRN: [id number] : 1970        Preoperative Diagnosis: Screen for colon cancer [Z12.11]   Procedure : Procedure(s):  COLONOSCOPY     Past Medical History:   Diagnosis Date     Abnormal Pap smear of cervix 2019    See problem list     Atrial fibrillation (H)      Diverticula of colon      Family history of colon cancer in mother      Family history of colon cancer in mother      Uterine fibroid       Past Surgical History:   Procedure Laterality Date     ANESTHESIA CARDIOVERSION N/A 2019    Procedure: Anesthesia Coverage Cardioversion @1130;  Surgeon: GENERIC ANESTHESIA PROVIDER;  Location: [facility] OR     COLONOSCOPY       MYOMECTOMY UTERUS      hysteroscopic myomectomy     NO HISTORY OF SURGERY        No Known Allergies   Social History     Tobacco Use     Smoking status: Never Smoker     Smokeless tobacco: Never Used   Substance Use Topics     Alcohol use: Never     Frequency: Never      Wt Readings from Last 1 Encounters:   21 99.5 kg (219 lb 6.4 oz)        Anesthesia Evaluation   Pt has had prior anesthetic. Type: General and MAC.        ROS/MED HX  ENT/Pulmonary:  - neg pulmonary ROS     Neurologic:  - neg neurologic ROS     Cardiovascular:     (+) -----dysrhythmias, a-fib, Previous cardiac testing   Echo: Date: Results:    Stress Test: Date: Results:    ECG Reviewed: Date: 2019 Results:  Sinus Rhythm   WITHIN NORMAL LIMITS  Cath: Date: Results:      METS/Exercise Tolerance:     Hematologic:  - neg hematologic  ROS     Musculoskeletal:  - neg musculoskeletal ROS     GI/Hepatic: Comment: Diverticula of colon  - neg GI/hepatic ROS     Renal/Genitourinary:  - neg Renal ROS     Endo:  - neg endo ROS     Psychiatric/Substance Use:  - neg psychiatric ROS     Infectious Disease:  - neg infectious disease ROS     Malignancy:  - neg malignancy ROS     Other:            Physical Exam    Airway        Mallampati: II   TM distance:  > 3 FB   Neck ROM: full   Mouth opening: > 3 cm    Respiratory Devices and Support         Dental  no notable dental history         Cardiovascular   cardiovascular exam normal          Pulmonary   pulmonary exam normal                OUTSIDE LABS:  CBC:   Lab Results   Component Value Date    WBC 10.8 11/10/2019    WBC 6.7 04/29/2019    HGB 15.3 11/10/2019    HGB 13.8 04/29/2019    HCT 47.2 (H) 11/10/2019    HCT 42.2 04/29/2019     11/10/2019     04/29/2019     BMP:   Lab Results   Component Value Date     11/10/2019     04/29/2019    POTASSIUM 3.9 11/10/2019    POTASSIUM 4.4 05/13/2019    CHLORIDE 110 (H) 11/10/2019    CHLORIDE 110 (H) 04/29/2019    CO2 24 11/10/2019    CO2 25 04/29/2019    BUN 23 11/10/2019    BUN 19 04/29/2019    CR 0.83 11/10/2019    CR 0.87 04/29/2019    GLC 93 04/21/2021     (H) 11/10/2019     COAGS:   Lab Results   Component Value Date    INR 1.34 (H) 05/13/2019     POC:   Lab Results   Component Value Date    HCG Negative 08/07/2019     HEPATIC:   Lab Results   Component Value Date    ALBUMIN 4.2 11/10/2019    PROTTOTAL 8.4 11/10/2019    ALT 36 11/10/2019    AST 21 11/10/2019    ALKPHOS 103 11/10/2019    BILITOTAL 0.7 11/10/2019     OTHER:   Lab Results   Component Value Date    NOVA 9.1 11/10/2019    MAG 2.3 05/13/2019    TSH 1.87 04/29/2019       Anesthesia Plan    ASA Status:  1      Anesthesia Type: General.   Induction: Intravenous.           Consents            Postoperative Care            Comments:                ANJU Damico CRNA

## 2021-05-25 ENCOUNTER — HOSPITAL ENCOUNTER (OUTPATIENT)
Facility: CLINIC | Age: 51
Discharge: HOME | End: 2021-05-25
Attending: SURGERY | Admitting: SURGERY
Payer: COMMERCIAL

## 2021-05-25 ENCOUNTER — ANESTHESIA (OUTPATIENT)
Dept: GASTROENTEROLOGY | Facility: CLINIC | Age: 51
End: 2021-05-25
Payer: COMMERCIAL

## 2021-05-25 VITALS
WEIGHT: 220 LBS | RESPIRATION RATE: 18 BRPM | OXYGEN SATURATION: 95 % | BODY MASS INDEX: 36.65 KG/M2 | HEART RATE: 84 BPM | HEIGHT: 65 IN | DIASTOLIC BLOOD PRESSURE: 74 MMHG | SYSTOLIC BLOOD PRESSURE: 128 MMHG | TEMPERATURE: 97.8 F

## 2021-05-25 LAB — COLONOSCOPY: NORMAL

## 2021-05-25 PROCEDURE — 258N000003 HC RX IP 258 OP 636: Performed by: SURGERY

## 2021-05-25 PROCEDURE — 250N000009 HC RX 250: Performed by: NURSE ANESTHETIST, CERTIFIED REGISTERED

## 2021-05-25 PROCEDURE — 45378 DIAGNOSTIC COLONOSCOPY: CPT | Performed by: SURGERY

## 2021-05-25 PROCEDURE — G0105 COLORECTAL SCRN; HI RISK IND: HCPCS | Performed by: SURGERY

## 2021-05-25 PROCEDURE — 370N000017 HC ANESTHESIA TECHNICAL FEE, PER MIN: Performed by: SURGERY

## 2021-05-25 PROCEDURE — 250N000009 HC RX 250: Performed by: SURGERY

## 2021-05-25 RX ORDER — LIDOCAINE 40 MG/G
CREAM TOPICAL
Status: DISCONTINUED | OUTPATIENT
Start: 2021-05-25 | End: 2021-05-25 | Stop reason: HOSPADM

## 2021-05-25 RX ORDER — LIDOCAINE HYDROCHLORIDE 20 MG/ML
INJECTION, SOLUTION INFILTRATION; PERINEURAL PRN
Status: DISCONTINUED | OUTPATIENT
Start: 2021-05-25 | End: 2021-05-25

## 2021-05-25 RX ORDER — ONDANSETRON 2 MG/ML
4 INJECTION INTRAMUSCULAR; INTRAVENOUS
Status: DISCONTINUED | OUTPATIENT
Start: 2021-05-25 | End: 2021-05-25 | Stop reason: HOSPADM

## 2021-05-25 RX ORDER — SODIUM CHLORIDE, SODIUM LACTATE, POTASSIUM CHLORIDE, CALCIUM CHLORIDE 600; 310; 30; 20 MG/100ML; MG/100ML; MG/100ML; MG/100ML
INJECTION, SOLUTION INTRAVENOUS CONTINUOUS
Status: DISCONTINUED | OUTPATIENT
Start: 2021-05-25 | End: 2021-05-25 | Stop reason: HOSPADM

## 2021-05-25 RX ORDER — PROPOFOL 10 MG/ML
INJECTION, EMULSION INTRAVENOUS CONTINUOUS PRN
Status: DISCONTINUED | OUTPATIENT
Start: 2021-05-25 | End: 2021-05-25

## 2021-05-25 RX ADMIN — SODIUM CHLORIDE, POTASSIUM CHLORIDE, SODIUM LACTATE AND CALCIUM CHLORIDE: 600; 310; 30; 20 INJECTION, SOLUTION INTRAVENOUS at 07:31

## 2021-05-25 RX ADMIN — LIDOCAINE HYDROCHLORIDE 0.1 ML: 10 INJECTION, SOLUTION EPIDURAL; INFILTRATION; INTRACAUDAL; PERINEURAL at 07:31

## 2021-05-25 RX ADMIN — LIDOCAINE HYDROCHLORIDE 10 ML: 20 INJECTION, SOLUTION INFILTRATION; PERINEURAL at 08:09

## 2021-05-25 RX ADMIN — PROPOFOL 200 MCG/KG/MIN: 10 INJECTION, EMULSION INTRAVENOUS at 08:09

## 2021-05-25 ASSESSMENT — MIFFLIN-ST. JEOR: SCORE: 1613.79

## 2021-05-25 NOTE — BRIEF OP NOTE
[facility]   Brief Operative Note    Pre-operative diagnosis: Screen for colon cancer [Z12.11]   Post-operative diagnosis moderate diverticulosis, otherwise normal     Procedure: Procedure(s):  COLONOSCOPY   Surgeon(s): Surgeon(s) and Role:     * Tarun Wren MD - Primary   Estimated blood loss: * No values recorded between 5/25/2021  8:14 AM and 5/25/2021  8:41 AM *    Specimens: * No specimens in log *   Findings: 1. Diverticulosis of primarily sigmoid and descending colon  2. Some diverticuli on the right side  3. Colon otherwise normal

## 2021-05-25 NOTE — H&P
"50 year old year old female here for colonoscopy for screening.    Patient Active Problem List   Diagnosis     Family history of colon cancer in mother     Atrial fibrillation, unspecified type (H)     ASCUS of cervix with negative high risk HPV     Post-menopausal bleeding       Past Medical History:   Diagnosis Date     Abnormal Pap smear of cervix 05/07/2019    See problem list     Atrial fibrillation (H)      Diverticula of colon      Family history of colon cancer in mother      Family history of colon cancer in mother      Uterine fibroid        Past Surgical History:   Procedure Laterality Date     ANESTHESIA CARDIOVERSION N/A 5/13/2019    Procedure: Anesthesia Coverage Cardioversion @1130;  Surgeon: GENERIC ANESTHESIA PROVIDER;  Location: [facility] OR     COLONOSCOPY       MYOMECTOMY UTERUS  2011    hysteroscopic myomectomy     NO HISTORY OF SURGERY         @FMX@    No current outpatient medications on file.       No Known Allergies    Pt reports that she has never smoked. She has never used smokeless tobacco. She reports that she does not drink alcohol or use drugs.    Exam:  /63   Temp 98  F (36.7  C) (Oral)   Resp 18   Ht 1.651 m (5' 5\")   Wt 99.8 kg (220 lb)   SpO2 96%   BMI 36.61 kg/m      Awake, Alert OX3  Lungs - CTA bilaterally  CV - RRR, no murmurs, distal pulses intact  Abd - soft, non-distended, non-tender, +BS  Extr - No cyanosis or edema    A/P 50 year old year old female in need of colonoscopy for screening. Risks, benefits, alternatives, and complications were discussed including the possibility of perforation and the patient agreed to proceed    Tarun Wren MD   "

## 2021-05-25 NOTE — ANESTHESIA CARE TRANSFER NOTE
Patient: Farida Toth    Procedure(s):  COLONOSCOPY    Diagnosis: Screen for colon cancer [Z12.11]  Diagnosis Additional Information: No value filed.    Anesthesia Type:   General     Note:      Level of Consciousness: drowsy      Independent Airway: airway patency satisfactory and stable    Vital Signs Stable: post-procedure vital signs reviewed and stable  Report to RN Given: handoff report given  Patient transferred to: Phase II    Handoff Report: Identifed the Patient, Identified the Reponsible Provider, Reviewed the pertinent medical history, Discussed the surgical course, Reviewed Intra-OP anesthesia mangement and issues during anesthesia, Set expectations for post-procedure period and Allowed opportunity for questions and acknowledgement of understanding      Vitals: (Last set prior to Anesthesia Care Transfer)  CRNA VITALS  5/25/2021 0809 - 5/25/2021 0840      5/25/2021             Pulse:  76    Ht Rate:  75    SpO2:  96 %    Resp Rate (observed):  16        Electronically Signed By: ANJU Barnhart CRNA  May 25, 2021  8:40 AM

## 2021-05-25 NOTE — ANESTHESIA POSTPROCEDURE EVALUATION
Patient: Farida Toth    Procedure(s):  COLONOSCOPY    Diagnosis:Screen for colon cancer [Z12.11]  Diagnosis Additional Information: No value filed.    Anesthesia Type:  General    Note:  Disposition: Outpatient   Postop Pain Control: Uneventful            Sign Out: Well controlled pain   PONV: No   Neuro/Psych: Uneventful            Sign Out: Acceptable/Baseline neuro status   Airway/Respiratory: Uneventful            Sign Out: Acceptable/Baseline resp. status   CV/Hemodynamics: Uneventful            Sign Out: Acceptable CV status; No obvious hypovolemia; No obvious fluid overload   Other NRE: NONE   DID A NON-ROUTINE EVENT OCCUR? No           Last vitals:  Vitals:    05/25/21 0704   BP: 122/63   Resp: 18   Temp: 36.7  C (98  F)   SpO2: 96%       Last vitals prior to Anesthesia Care Transfer:  CRNA VITALS  5/25/2021 0809 - 5/25/2021 0840      5/25/2021             Pulse:  76    Ht Rate:  75    SpO2:  96 %    Resp Rate (observed):  16          Electronically Signed By: ANJU Barnhart CRNA  May 25, 2021  8:40 AM

## 2021-06-17 ENCOUNTER — HOSPITAL ENCOUNTER (OUTPATIENT)
Dept: MAMMOGRAPHY | Facility: CLINIC | Age: 51
Discharge: HOME | End: 2021-06-17
Attending: FAMILY MEDICINE | Admitting: FAMILY MEDICINE
Payer: COMMERCIAL

## 2021-06-17 DIAGNOSIS — Z12.31 ENCOUNTER FOR SCREENING MAMMOGRAM FOR BREAST CANCER: ICD-10-CM

## 2021-06-17 PROCEDURE — 77063 BREAST TOMOSYNTHESIS BI: CPT

## 2021-07-21 ENCOUNTER — OFFICE VISIT (OUTPATIENT)
Dept: FAMILY MEDICINE | Facility: CLINIC | Age: 51
End: 2021-07-21
Payer: COMMERCIAL

## 2021-07-21 VITALS
DIASTOLIC BLOOD PRESSURE: 78 MMHG | BODY MASS INDEX: 36.67 KG/M2 | RESPIRATION RATE: 14 BRPM | HEIGHT: 65 IN | WEIGHT: 220.1 LBS | OXYGEN SATURATION: 98 % | SYSTOLIC BLOOD PRESSURE: 118 MMHG | TEMPERATURE: 99.8 F | HEART RATE: 89 BPM

## 2021-07-21 DIAGNOSIS — K57.32 DIVERTICULITIS OF COLON: Primary | ICD-10-CM

## 2021-07-21 LAB
ANION GAP SERPL CALCULATED.3IONS-SCNC: 5 MMOL/L (ref 3–14)
BUN SERPL-MCNC: 15 MG/DL (ref 7–30)
CALCIUM SERPL-MCNC: 9.3 MG/DL (ref 8.5–10.1)
CHLORIDE BLD-SCNC: 103 MMOL/L (ref 94–109)
CO2 SERPL-SCNC: 27 MMOL/L (ref 20–32)
CREAT SERPL-MCNC: 0.95 MG/DL (ref 0.52–1.04)
CRP SERPL-MCNC: 104 MG/L (ref 0–8)
ERYTHROCYTE [DISTWIDTH] IN BLOOD BY AUTOMATED COUNT: 13.3 % (ref 10–15)
GFR SERPL CREATININE-BSD FRML MDRD: 70 ML/MIN/1.73M2
GLUCOSE BLD-MCNC: 95 MG/DL (ref 70–99)
HCT VFR BLD AUTO: 43.2 % (ref 35–47)
HGB BLD-MCNC: 14.3 G/DL (ref 11.7–15.7)
MCH RBC QN AUTO: 30.2 PG (ref 26.5–33)
MCHC RBC AUTO-ENTMCNC: 33.1 G/DL (ref 31.5–36.5)
MCV RBC AUTO: 91 FL (ref 78–100)
PLATELET # BLD AUTO: 285 10E3/UL (ref 150–450)
POTASSIUM BLD-SCNC: 4.3 MMOL/L (ref 3.4–5.3)
RBC # BLD AUTO: 4.73 10E6/UL (ref 3.8–5.2)
SODIUM SERPL-SCNC: 135 MMOL/L (ref 133–144)
WBC # BLD AUTO: 12.3 10E3/UL (ref 4–11)

## 2021-07-21 PROCEDURE — 80048 BASIC METABOLIC PNL TOTAL CA: CPT | Performed by: NURSE PRACTITIONER

## 2021-07-21 PROCEDURE — 85027 COMPLETE CBC AUTOMATED: CPT | Performed by: NURSE PRACTITIONER

## 2021-07-21 PROCEDURE — 86140 C-REACTIVE PROTEIN: CPT | Performed by: NURSE PRACTITIONER

## 2021-07-21 PROCEDURE — 36415 COLL VENOUS BLD VENIPUNCTURE: CPT | Performed by: NURSE PRACTITIONER

## 2021-07-21 PROCEDURE — 99213 OFFICE O/P EST LOW 20 MIN: CPT | Performed by: NURSE PRACTITIONER

## 2021-07-21 ASSESSMENT — MIFFLIN-ST. JEOR: SCORE: 1614.25

## 2021-07-21 NOTE — PROGRESS NOTES
"    Assessment & Plan     Diverticulitis of colon  Patient with history of diverticulitis and this feels the same, exam with LLQ TTP, no severe pain. Leukocytosis on CBC today. Discussed treating with antibiotics vs obtaining imaging today to r/o perforation, abscess. Nothing to suggest sepsis. Via shared decision making, we determined we will treat with abx and if things are worsening she will let me know and we will obtain imaging. If severe worsening, will present to ER.   - CBC with platelets; Future  - CRP, inflammation; Future  - Basic metabolic panel  (Ca, Cl, CO2, Creat, Gluc, K, Na, BUN); Future  - amoxicillin-clavulanate (AUGMENTIN) 875-125 MG tablet; Take 1 tablet by mouth 2 times daily for 10 days      Patient Instructions   Labs today.  Take Augmentin twice daily with food. Take a probiotic such as Culturelle or Florastor (recommend lactobacillus 50 billion CFU twice daily  by at least one hour from the antibiotic) while on the antibiotic or eat a Greek yogurt containing \"live active cultures\" daily.        Return in about 2 days (around 7/23/2021) for worsening or continued symptoms.    ANJU Kam CNP  [facility]    Austin Iqbal is a 50 year old who presents for the following health issues     HPI     Abdominal/Flank Pain  Onset/Duration: Sunday night   Description:   Character: Cramping  Location: left lower quadrant  Radiation: None  Intensity: moderate  Progression of Symptoms:  waxing and waning  Accompanying Signs & Symptoms:  Fever/Chills: YES - low grade today and high last night.   Gas/Bloating: YES  Nausea: YES  Vomitting: YES  Diarrhea: YES  Constipation: YES  Also dizzy today   Dysuria or Hematuria: no  History:   Trauma: no  Previous similar pain: YES- hx of diverticulitis   Previous tests done: Colonoscopy  Precipitating factors:   Does the pain change with:     Food: YES    Bowel Movement: YES    Urination: no   Other factors:  " "no  Therapies tried and outcome: None. Tried laying flat     Above HPI reviewed. Additionally, history of diverticulitis but not in several years. Feels the same. Fever started last night. Colonoscopy 5/25/2021 showing diverticulosis. Is having diarrhea, did have some hematochezia a few days ago. No vomiting. Taking fluids with no problem.       No LMP recorded. Patient is postmenopausal.      Review of Systems   Constitutional, HEENT, cardiovascular, pulmonary, gi and gu systems are negative, except as otherwise noted.      Objective    /78 (BP Location: Left arm, Patient Position: Sitting, Cuff Size: Adult Large)   Pulse 89   Temp 99.8  F (37.7  C) (Tympanic)   Resp 14   Ht 1.651 m (5' 5\")   Wt 99.8 kg (220 lb 1.6 oz)   SpO2 98%   BMI 36.63 kg/m    Body mass index is 36.63 kg/m .  Physical Exam  Vitals and nursing note reviewed.   Constitutional:       General: She is not in acute distress.     Appearance: Normal appearance.   HENT:      Head: Normocephalic and atraumatic.      Mouth/Throat:      Mouth: Mucous membranes are moist.   Cardiovascular:      Rate and Rhythm: Normal rate.   Pulmonary:      Effort: Pulmonary effort is normal.   Abdominal:      Tenderness: There is abdominal tenderness (LLQ).   Musculoskeletal:      Cervical back: Neck supple.   Skin:     General: Skin is warm and dry.   Neurological:      General: No focal deficit present.      Mental Status: She is alert.   Psychiatric:         Mood and Affect: Mood normal.         Behavior: Behavior normal.            Results for orders placed or performed in visit on 07/21/21 (from the past 24 hour(s))   CBC with platelets   Result Value Ref Range    WBC Count 12.3 (H) 4.0 - 11.0 10e3/uL    RBC Count 4.73 3.80 - 5.20 10e6/uL    Hemoglobin 14.3 11.7 - 15.7 g/dL    Hematocrit 43.2 35.0 - 47.0 %    MCV 91 78 - 100 fL    MCH 30.2 26.5 - 33.0 pg    MCHC 33.1 31.5 - 36.5 g/dL    RDW 13.3 10.0 - 15.0 %    Platelet Count 285 150 - 450 10e3/uL "

## 2021-07-21 NOTE — PATIENT INSTRUCTIONS
"Labs today.  Take Augmentin twice daily with food. Take a probiotic such as Culturelle or Florastor (recommend lactobacillus 50 billion CFU twice daily  by at least one hour from the antibiotic) while on the antibiotic or eat a Greek yogurt containing \"live active cultures\" daily.    "

## 2021-09-15 NOTE — NURSING NOTE
"Chief Complaint   Patient presents with     Atrial Fib     NEW 5/9/19 Dr. Cardona for new diagnosis of afib EKG showing afib rate 90 bpm. Echo normal. Started on beta blocker. Symptoms (lightheadedness) better since starting carvedilol 3.125 mg BID. Pt reports chest pressure, dizziness, SOB, palpitations, excessive fatigue.       Initial /75 (BP Location: Left arm, Patient Position: Left side, Cuff Size: Adult Large)   Pulse 64   Wt 100.2 kg (221 lb)   SpO2 99%   BMI 37.35 kg/m   Estimated body mass index is 37.35 kg/m  as calculated from the following:    Height as of 5/7/19: 1.638 m (5' 4.5\").    Weight as of this encounter: 100.2 kg (221 lb)..  BP completed using cuff size: large    EKG was done.  Kala Champagne MA    "
81.6

## 2021-10-10 ENCOUNTER — HEALTH MAINTENANCE LETTER (OUTPATIENT)
Age: 51
End: 2021-10-10

## 2022-04-24 ENCOUNTER — HOSPITAL ENCOUNTER (EMERGENCY)
Facility: CLINIC | Age: 52
Discharge: HOME | End: 2022-04-24
Attending: FAMILY MEDICINE | Admitting: FAMILY MEDICINE
Payer: COMMERCIAL

## 2022-04-24 ENCOUNTER — APPOINTMENT (OUTPATIENT)
Dept: CT IMAGING | Facility: CLINIC | Age: 52
End: 2022-04-24
Attending: FAMILY MEDICINE
Payer: COMMERCIAL

## 2022-04-24 VITALS
HEART RATE: 70 BPM | TEMPERATURE: 96.9 F | OXYGEN SATURATION: 97 % | DIASTOLIC BLOOD PRESSURE: 77 MMHG | SYSTOLIC BLOOD PRESSURE: 124 MMHG | RESPIRATION RATE: 20 BRPM

## 2022-04-24 DIAGNOSIS — R07.9 CHEST PAIN, UNSPECIFIED TYPE: ICD-10-CM

## 2022-04-24 DIAGNOSIS — R00.2 PALPITATIONS: ICD-10-CM

## 2022-04-24 LAB
ALBUMIN SERPL-MCNC: 3.7 G/DL (ref 3.4–5)
ALP SERPL-CCNC: 98 U/L (ref 40–150)
ALT SERPL W P-5'-P-CCNC: 60 U/L (ref 0–50)
ANION GAP SERPL CALCULATED.3IONS-SCNC: 5 MMOL/L (ref 3–14)
AST SERPL W P-5'-P-CCNC: 32 U/L (ref 0–45)
BASOPHILS # BLD AUTO: 0.1 10E3/UL (ref 0–0.2)
BASOPHILS NFR BLD AUTO: 1 %
BILIRUB SERPL-MCNC: 0.5 MG/DL (ref 0.2–1.3)
BUN SERPL-MCNC: 15 MG/DL (ref 7–30)
CALCIUM SERPL-MCNC: 9.4 MG/DL (ref 8.5–10.1)
CHLORIDE BLD-SCNC: 108 MMOL/L (ref 94–109)
CO2 SERPL-SCNC: 27 MMOL/L (ref 20–32)
CREAT SERPL-MCNC: 0.89 MG/DL (ref 0.52–1.04)
D DIMER PPP FEU-MCNC: 0.84 UG/ML FEU (ref 0–0.5)
EOSINOPHIL # BLD AUTO: 0.2 10E3/UL (ref 0–0.7)
EOSINOPHIL NFR BLD AUTO: 2 %
ERYTHROCYTE [DISTWIDTH] IN BLOOD BY AUTOMATED COUNT: 13.2 % (ref 10–15)
GFR SERPL CREATININE-BSD FRML MDRD: 78 ML/MIN/1.73M2
GLUCOSE BLD-MCNC: 101 MG/DL (ref 70–99)
HCT VFR BLD AUTO: 43.3 % (ref 35–47)
HGB BLD-MCNC: 14.2 G/DL (ref 11.7–15.7)
IMM GRANULOCYTES # BLD: 0 10E3/UL
IMM GRANULOCYTES NFR BLD: 0 %
LIPASE SERPL-CCNC: 117 U/L (ref 73–393)
LYMPHOCYTES # BLD AUTO: 3 10E3/UL (ref 0.8–5.3)
LYMPHOCYTES NFR BLD AUTO: 33 %
MCH RBC QN AUTO: 29.5 PG (ref 26.5–33)
MCHC RBC AUTO-ENTMCNC: 32.8 G/DL (ref 31.5–36.5)
MCV RBC AUTO: 90 FL (ref 78–100)
MONOCYTES # BLD AUTO: 0.5 10E3/UL (ref 0–1.3)
MONOCYTES NFR BLD AUTO: 6 %
NEUTROPHILS # BLD AUTO: 5.4 10E3/UL (ref 1.6–8.3)
NEUTROPHILS NFR BLD AUTO: 58 %
NRBC # BLD AUTO: 0 10E3/UL
NRBC BLD AUTO-RTO: 0 /100
PLATELET # BLD AUTO: 285 10E3/UL (ref 150–450)
POTASSIUM BLD-SCNC: 4.1 MMOL/L (ref 3.4–5.3)
PROT SERPL-MCNC: 7.4 G/DL (ref 6.8–8.8)
RBC # BLD AUTO: 4.81 10E6/UL (ref 3.8–5.2)
SODIUM SERPL-SCNC: 140 MMOL/L (ref 133–144)
TROPONIN I SERPL HS-MCNC: <3 NG/L
TROPONIN I SERPL HS-MCNC: <3 NG/L
TSH SERPL DL<=0.005 MIU/L-ACNC: 1.17 MU/L (ref 0.4–4)
WBC # BLD AUTO: 9.2 10E3/UL (ref 4–11)

## 2022-04-24 PROCEDURE — 85025 COMPLETE CBC W/AUTO DIFF WBC: CPT | Performed by: FAMILY MEDICINE

## 2022-04-24 PROCEDURE — 84484 ASSAY OF TROPONIN QUANT: CPT | Performed by: FAMILY MEDICINE

## 2022-04-24 PROCEDURE — 85379 FIBRIN DEGRADATION QUANT: CPT | Performed by: FAMILY MEDICINE

## 2022-04-24 PROCEDURE — 93010 ELECTROCARDIOGRAM REPORT: CPT | Performed by: FAMILY MEDICINE

## 2022-04-24 PROCEDURE — 250N000011 HC RX IP 250 OP 636: Performed by: FAMILY MEDICINE

## 2022-04-24 PROCEDURE — 83690 ASSAY OF LIPASE: CPT | Performed by: FAMILY MEDICINE

## 2022-04-24 PROCEDURE — 80053 COMPREHEN METABOLIC PANEL: CPT | Performed by: FAMILY MEDICINE

## 2022-04-24 PROCEDURE — 84484 ASSAY OF TROPONIN QUANT: CPT | Mod: 91 | Performed by: FAMILY MEDICINE

## 2022-04-24 PROCEDURE — 84443 ASSAY THYROID STIM HORMONE: CPT | Performed by: FAMILY MEDICINE

## 2022-04-24 PROCEDURE — 99285 EMERGENCY DEPT VISIT HI MDM: CPT | Mod: 25 | Performed by: FAMILY MEDICINE

## 2022-04-24 PROCEDURE — 36415 COLL VENOUS BLD VENIPUNCTURE: CPT | Performed by: FAMILY MEDICINE

## 2022-04-24 PROCEDURE — 93005 ELECTROCARDIOGRAM TRACING: CPT | Performed by: FAMILY MEDICINE

## 2022-04-24 PROCEDURE — 250N000009 HC RX 250: Performed by: FAMILY MEDICINE

## 2022-04-24 PROCEDURE — 71275 CT ANGIOGRAPHY CHEST: CPT

## 2022-04-24 RX ORDER — IOPAMIDOL 755 MG/ML
89 INJECTION, SOLUTION INTRAVASCULAR ONCE
Status: COMPLETED | OUTPATIENT
Start: 2022-04-24 | End: 2022-04-24

## 2022-04-24 RX ADMIN — IOPAMIDOL 89 ML: 755 INJECTION, SOLUTION INTRAVENOUS at 18:55

## 2022-04-24 RX ADMIN — SODIUM CHLORIDE 100 ML: 9 INJECTION, SOLUTION INTRAVENOUS at 18:55

## 2022-04-24 NOTE — ED TRIAGE NOTES
Pt having intermittent palpitations had episode of chest pain today with nausea. Pt has a hx of afib

## 2022-04-24 NOTE — ED PROVIDER NOTES
"  History     Chief Complaint   Patient presents with     Palpitations     HPI    Farida Toth is a 51 year old female who with chest pain.  She had an episode last night where she had epigastric pain and vomiting after heavy meal which then resolved.  She woke up this morning and got up and then noticed chest pain that was not severe and she described as a \"sore pain.\"  This occurred on and off and was associated with a feeling of fluttering in her heart.  The flutters are brief irregular beats but not sustained irregularity.  She has a past history of atrial fibrillation in 2019 and had a cardioversion at that time followed by Zio patch monitoring which showed no A. fib on subsequent monitoring and she has had no symptoms of A. fib subsequently.  Subsequently at about 1 or 130 she developed a more severe pain in the left chest radiating to the left arm that was frightening to her and unlike anything she has had previously.  She felt lightheaded, diaphoretic, short of breath, nauseated with it.  She describes it as a \"hot pain.\"  At that point she decided to come to the emergency department.  When she arrived her pain had resolved and when her EKG was performed she was not having pain.  She has otherwise had no identified chronic medical problems.  She takes no current medications.  She does not smoke.  She has about 2 alcoholic beverages per week.  He has been doing a lot of yard work in the past few days and a lot of raking.  She has not had leg edema or calf pain.    Allergies:  No Known Allergies    Problem List:    Patient Active Problem List    Diagnosis Date Noted     Post-menopausal bleeding 07/30/2019     Priority: Medium     ASCUS of cervix with negative high risk HPV 05/07/2019     Priority: Medium     1991, 1995, 2006, 2010, 2012 NIL paps.   5/7/19 ASCUS pap with Neg HPV. Plan cotest in 3 years.        Atrial fibrillation, unspecified type (H) 04/29/2019     Priority: Medium     Family history of " colon cancer in mother      Priority: Medium        Past Medical History:    Past Medical History:   Diagnosis Date     Abnormal Pap smear of cervix 2019     Atrial fibrillation (H)      Diverticula of colon      Family history of colon cancer in mother      Family history of colon cancer in mother      Uterine fibroid        Past Surgical History:    Past Surgical History:   Procedure Laterality Date     ANESTHESIA CARDIOVERSION N/A 2019    Procedure: Anesthesia Coverage Cardioversion @1130;  Surgeon: GENERIC ANESTHESIA PROVIDER;  Location: [facility] OR     COLONOSCOPY       COLONOSCOPY N/A 2021    Procedure: COLONOSCOPY;  Surgeon: Tarun Wren MD;  Location: [facility]     MYOMECTOMY UTERUS  2011    hysteroscopic myomectomy     NO HISTORY OF SURGERY         Family History:    Family History   Problem Relation Age of Onset     Colon Cancer Mother         Dx at age 58 and  at age 59 with mets     Hypertension Mother      Other - See Comments Mother         had hysterectomy in her 30s, for fibroids?     Stomach Cancer Father      Cerebrovascular Disease Father      Other Cancer Father         Skin Cancer/ from Stomach cancer     Obesity Father      Breast Cancer Sister         Dx in her 40s     Obesity Sister      Obesity Brother      Obesity Brother        Social History:  Marital Status:  Single [1]  Social History     Tobacco Use     Smoking status: Never Smoker     Smokeless tobacco: Never Used   Substance Use Topics     Alcohol use: Never     Drug use: Never        Medications:    COLLAGEN PO  [employer] BIOTIN PO  multivitamin w/minerals (MULTI-VITAMIN) tablet        Review of Systems    All other systems are reviewed and are negative    Physical Exam   BP: (!) 145/100  Pulse: 87  Temp: 96.9  F (36.1  C)  Resp: 18  SpO2: 96 %      Physical Exam    Nursing note and vitals were reviewed.  Constitutional: Awake and alert, adequately nourished and developed appearing 51-year-old in no apparent discomfort,  who does not appear acutely ill, and who answers questions appropriately and cooperates with examination.  HEENT: EOMI.   Neck: Freely mobile.  Cardiovascular: Cardiac examination reveals normal heart rate and regular rhythm without murmur.  Pulmonary/Chest: Breathing is unlabored.  Breath sounds are clear and equal bilaterally.  There no retractions, tachypnea, rales, wheezes, or rhonchi.  Chest wall is nontender to palpation.  Abdomen: Soft, nontender, no HSM or masses rebound or guarding.  Musculoskeletal: Extremities are warm and well-perfused and without edema  Neurological: Alert, oriented, thought content logical, coherent   Skin: Warm, dry, no rashes.  Psychiatric: Affect broad and appropriate.    ED Course                 Procedures              EKG Interpretation:      Interpreted by Mitchell García MD  Time reviewed: 14:53  Symptoms at time of EKG: none   Rhythm: normal sinus   Rate: normal  Axis: normal  Ectopy: none  Conduction: Poor R wave progression  ST Segments/ T Waves: No ST-T wave changes  Q Waves: none  Comparison to prior: Unchanged from 11/10/2019    Clinical Impression: [place] rhythm with poor R wave progression, otherwise normal EKG without signs of acute ischemia          Critical Care time:  none               Results for orders placed or performed during the hospital encounter of 04/24/22 (from the past 24 hour(s))   CBC with platelets differential    Narrative    The following orders were created for panel order CBC with platelets differential.  Procedure                               Abnormality         Status                     ---------                               -----------         ------                     CBC with platelets and d...[[id number]]                      Final result                 Please view results for these tests on the individual orders.   Comprehensive metabolic panel   Result Value Ref Range    Sodium 140 133 - 144 mmol/L    Potassium 4.1 3.4 - 5.3 mmol/L     Chloride 108 94 - 109 mmol/L    Carbon Dioxide (CO2) 27 20 - 32 mmol/L    Anion Gap 5 3 - 14 mmol/L    Urea Nitrogen 15 7 - 30 mg/dL    Creatinine 0.89 0.52 - 1.04 mg/dL    Calcium 9.4 8.5 - 10.1 mg/dL    Glucose 101 (H) 70 - 99 mg/dL    Alkaline Phosphatase 98 40 - 150 U/L    AST 32 0 - 45 U/L    ALT 60 (H) 0 - 50 U/L    Protein Total 7.4 6.8 - 8.8 g/dL    Albumin 3.7 3.4 - 5.0 g/dL    Bilirubin Total 0.5 0.2 - 1.3 mg/dL    GFR Estimate 78 >60 mL/min/1.73m2   TSH with free T4 reflex   Result Value Ref Range    TSH 1.17 0.40 - 4.00 mU/L   CBC with platelets and differential   Result Value Ref Range    WBC Count 9.2 4.0 - 11.0 10e3/uL    RBC Count 4.81 3.80 - 5.20 10e6/uL    Hemoglobin 14.2 11.7 - 15.7 g/dL    Hematocrit 43.3 35.0 - 47.0 %    MCV 90 78 - 100 fL    MCH 29.5 26.5 - 33.0 pg    MCHC 32.8 31.5 - 36.5 g/dL    RDW 13.2 10.0 - 15.0 %    Platelet Count 285 150 - 450 10e3/uL    % Neutrophils 58 %    % Lymphocytes 33 %    % Monocytes 6 %    % Eosinophils 2 %    % Basophils 1 %    % Immature Granulocytes 0 %    NRBCs per 100 WBC 0 <1 /100    Absolute Neutrophils 5.4 1.6 - 8.3 10e3/uL    Absolute Lymphocytes 3.0 0.8 - 5.3 10e3/uL    Absolute Monocytes 0.5 0.0 - 1.3 10e3/uL    Absolute Eosinophils 0.2 0.0 - 0.7 10e3/uL    Absolute Basophils 0.1 0.0 - 0.2 10e3/uL    Absolute Immature Granulocytes 0.0 <=0.4 10e3/uL    Absolute NRBCs 0.0 10e3/uL   Lipase   Result Value Ref Range    Lipase 117 73 - 393 U/L   Troponin I   Result Value Ref Range    Troponin I High Sensitivity <3 <54 ng/L   D dimer quantitative   Result Value Ref Range    D-Dimer Quantitative 0.84 (H) 0.00 - 0.50 ug/mL FEU    Narrative    This D-dimer assay is intended for use in conjunction with a clinical pretest probability assessment model to exclude pulmonary embolism (PE) and deep venous thrombosis (DVT) in outpatients suspected of PE or DVT. The cut-off value is 0.50 ug/mL FEU.   Troponin I   Result Value Ref Range    Troponin I High Sensitivity  <3 <54 ng/L   CT Chest Pulmonary Embolism w Contrast    Narrative    EXAM: CT CHEST PULMONARY EMBOLISM W CONTRAST  LOCATION: [facility]  DATE/TIME: 4/24/2022 6:54 PM    INDICATION: Chest pain, elevated D dimer  COMPARISON: None.  TECHNIQUE: CT chest pulmonary angiogram during arterial phase injection of IV contrast. Multiplanar reformats and MIP reconstructions were performed. Dose reduction techniques were used.   CONTRAST: 89 mL Isovue 370     FINDINGS:  ANGIOGRAM CHEST: Pulmonary arteries are normal caliber and negative for pulmonary emboli. Thoracic aorta is negative for dissection. No CT evidence of right heart strain.    LUNGS AND PLEURA: Mild dependent atelectasis both lower lobes. Mosaic attenuation pattern both lungs. No infiltrates or pleural effusions.    MEDIASTINUM/AXILLAE: No adenopathy. Small esophageal hiatal hernia.    CORONARY ARTERY CALCIFICATION: None.    UPPER ABDOMEN: Normal.    MUSCULOSKELETAL: Normal.      Impression    IMPRESSION:  1.  No evidence for pulmonary emboli.  2.  Mosaic attenuation pattern both lungs suggesting air trapping or small airways disease. Otherwise no evidence for acute pulmonary disease.       Medications   iopamidol (ISOVUE-370) solution 89 mL (89 mLs Intravenous Given 4/24/22 1855)   sodium chloride 0.9 % bag 500mL for CT scan flush use (100 mLs Intravenous Given 4/24/22 1855)       Assessments & Plan (with Medical Decision Making)     51-year-old female presented with the above symptoms of chest pain with several different episodes of different quality as described above.  At the time of her EKG she was not having pain.  Serial troponins were negative.  D-dimer was slightly elevated and she underwent CT scan of the chest which showed mosaic attenuation pattern possibly due to small airways disease but otherwise no abnormalities.  This is not likely the cause of her symptoms.  Her work-up is reassuring and despite the ominous sound of her  symptoms I suspect ACS and angina are not the cause.  There is no sign of pneumothorax, pulmonary embolism, or other urgent cause for the symptoms.  The cause is uncertain but could be due to bowel spasm or chest wall pain from costochondral joints or intercostal muscles.  She has been doing a lot of yard work and raking and thus could have irritated these areas in that regard.  She is asymptomatic at the time of discharge.  I think we can discontinue the work-up at this time and follow clinically.  If her symptoms recur I would like her to return for reevaluation.  She and her  expressed understanding and their questions were answered.    I have reviewed the nursing notes.    I have reviewed the findings, diagnosis, plan and need for follow up with the patient.       Discharge Medication List as of 4/24/2022  8:18 PM          Final diagnoses:   Chest pain, unspecified type   Palpitations       4/24/2022   [facility] EMERGENCY DEPT     Mitchell García MD  04/25/22 0108

## 2022-04-25 NOTE — DISCHARGE INSTRUCTIONS
Your test results today are all reassuring.  I do not believe your chest pain is due to heart disease or significant lung disease.  It could be due to bowel causes that are not worrisome.  It could be chest wall pain.  I do want you to return if the pain recurs and is persistent lasting more than a few minutes.  There is no sign that the palpitations are due to atrial fibrillation or any other serious heart arrhythmia.  If you develop a rapid irregular constant heart rhythm, you should return for reevaluation.

## 2022-05-21 ENCOUNTER — HEALTH MAINTENANCE LETTER (OUTPATIENT)
Age: 52
End: 2022-05-21

## 2022-06-13 NOTE — PROGRESS NOTES
Student's Name:   Harris Leon Birth Date  2005  Sex  female  Race/Ethnicity   School/Grade Level/ID#     Address:   [address]  \[facility] [place]\"" [id number]  Parent/Guardian             Telephone#                                            Home:                               Work:   IMMUNIZATIONS: To be completed by health care provider. The mo/da/yr for every dose administered is required. If a specific vaccine is medically contraindicated, a separate written statement must be attached by the health care responsible for completing the health examination explaining the medical reason for the contraindication.      Immunization History   Administered Date(s) Administered   • COVID-19 12Y+ Pfizer-BioNtech - Requires Dilution 07/29/2021, 08/20/2021   • DTaP(INFANRIX) 2004, 2004, 2004, 08/01/2005, 12/01/2008   • HPV 9-Valent 12/12/2015   • HPV Quadrivalent 06/08/2015, 08/08/2015   • Hep A, Unspecified formulation 01/10/2014, 02/26/2015   • Hep B, adult 2004, 2004, 2004   • Hib (PRP-OMP) 2004, 2004, 2004, 08/01/2005   • MMR 04/13/2005, 12/01/2008   • Meningococcal Conjugate MCV4P (Menactra) 02/26/2015, 06/24/2020   • Meningococcal Group B OMV 2 Dose(Bexsero) 06/17/2021, 06/13/2022   • Pneumococcal Conjugate 7 Valent 2004, 2004, 2004, 08/01/2008   • Polio, INACTIVATED 2004, 2004, 08/01/2005, 12/01/2008   • Tdap 02/26/2015   • Varicella 04/13/2005, 12/01/2008      Immunizations given 6/13/2022:       Health care provider (MD, DO, APN, PA, school health professional, health official) verifying above immunization history must sign below. If adding dates to the above immunization history section, put your initials by date(s) and sign here.)  Signature                                                                 Title                                                 Subjective     Farida Toth is a 48 year old female who presents to clinic today for the following health issues:    HPI   Concern - vaginal bleeding  Onset: Saturday night    Description:   Vaginal bleeding-post menopausal     Progression of Symptoms:  Worsening-started light-now heavy bleeding and clots.  Had not had a period for more than 5 years, she believes it to be around 8 years. Abdominal cramping.     Accompanying Signs & Symptoms:  Cardiologist told her to stop eliquis and asa as afib has resolved   Therapies Tried and outcome: none      Patient Active Problem List   Diagnosis     Family history of colon cancer in mother     Atrial fibrillation, unspecified type (H)     ASCUS of cervix with negative high risk HPV     Post-menopausal bleeding     Past Surgical History:   Procedure Laterality Date     ANESTHESIA CARDIOVERSION N/A 2019    Procedure: Anesthesia Coverage Cardioversion @1130;  Surgeon: GENERIC ANESTHESIA PROVIDER;  Location: [facility] OR     COLONOSCOPY       NO HISTORY OF SURGERY         Social History     Tobacco Use     Smoking status: Never Smoker     Smokeless tobacco: Never Used   Substance Use Topics     Alcohol use: Never     Frequency: Never     Family History   Problem Relation Age of Onset     Colon Cancer Mother         Dx at age 58 and  at age 59 with mets     Hypertension Mother      Stomach Cancer Father      Cerebrovascular Disease Father      Other Cancer Father         Skin Cancer/ from Stomach cancer     Obesity Father      Breast Cancer Sister         Dx in her 40s     Obesity Sister      Obesity Brother      Obesity Brother          No current outpatient medications on file.     No Known Allergies  Recent Labs   Lab Test 19  0838 19  1005   LDL  --  106*   HDL  --  53   TRIG  --  59   ALT  --  31   CR  --  0.87   GFRESTIMATED  --  78   GFRESTBLACK  --  >90   POTASSIUM 4.4 4.4   TSH  --  1.87      BP Readings from Last 3 Encounters:   19 111/73  "  06/06/19 115/64   05/13/19 108/78    Wt Readings from Last 3 Encounters:   07/30/19 101.1 kg (222 lb 12.8 oz)   06/06/19 99.8 kg (220 lb)   05/09/19 100.2 kg (221 lb)              Reviewed and updated as needed this visit by Provider  Tobacco  Allergies  Meds  Problems  Med Hx  Surg Hx  Fam Hx         Review of Systems   ROS COMP: Constitutional, HEENT, cardiovascular, pulmonary, GI, , musculoskeletal, neuro, skin, endocrine and psych systems are negative, except as otherwise noted.      Objective    /73   Pulse 74   Temp 97.3  F (36.3  C) (Oral)   Resp 16   Wt 101.1 kg (222 lb 12.8 oz)   SpO2 98%   BMI 37.65 kg/m    Body mass index is 37.65 kg/m .     Physical Exam   GENERAL: healthy, alert and no distress  RESP: lungs clear to auscultation - no rales, rhonchi or wheezes  CV: regular rate and rhythm, normal S1 S2, no S3 or S4, no murmur, click or rub, no peripheral edema and peripheral pulses strong  ABDOMEN: soft, no hepatosplenomegaly, no masses and bowel sounds normal, no CVA tenderness  POSITIVE for LLQ/RLQ abdominal tenderness with palpation, no rebound tenderness  PSYCH: mentation appears normal, affect normal/bright    Diagnostic Test Results:  Labs reviewed in Epic  See orders        Assessment & Plan       ICD-10-CM    1. Post-menopausal bleeding N95.0 US Pelvic Complete w Transvaginal   2. LLQ abdominal pain R10.32 US Pelvic Complete w Transvaginal   3. RLQ abdominal pain R10.31 US Pelvic Complete w Transvaginal   4. Atrial fibrillation, unspecified type (H) I48.91     blood thinners stopped per Cardiology         BMI:   Estimated body mass index is 37.65 kg/m  as calculated from the following:    Height as of 5/7/19: 1.638 m (5' 4.5\").    Weight as of this encounter: 101.1 kg (222 lb 12.8 oz).   Weight management plan: Discussed healthy diet and exercise guidelines        See Patient Instructions    Return in about 3 days (around 8/2/2019), or if symptoms worsen or fail to " Date  _____________________________________________________________________________________  Signature                                                                 Title                                                Date  _____________________________________________________________________________________   ALTERNATIVE PROOF OF IMMUNITY   1. Clinical diagnosis (measles, mumps, hepatitis B) is allowed when verified by physician and supported with lab confirmation.  Attach copy of lab result.    * MEASLES (Rubeola)  MO   DA  YR          **MUMPS  MO   DA  YR             HEPATITIS B  MO   DA   YR           VARICELLA  MO DA  YR      2. History of varicella (chickenpox) disease is acceptable if verified by health care provider, school health professional or health official.  Person signing below verifies that the parent/guardian’s description of varicella disease history is indicative of past infection and is accepting such history as documentation of disease.  Date of Disease                                  Signature                                                           Title   3. Laboratory Evidence of Immunity (check one)      __ Measles*         __ Mumps**        __ Rubella        __Varicella    (Attach copy of lab result)         *All measles cases diagnosed on or after July 1, 2002, must be confirmed by laboratory evidence.      **All mumps cases diagnosed on or after July 1, 2013, must be confirmed by laboratory evidence.     Completion of Alternative 1 or 3 MUST be accompanied by Labs & Physician Signature: ___________________________  Physician Statements of Immunity MUST be submitted to [facility] for review.     Certificates of Mu-ism Exemption to Immunizations or Physician Medical Statements of Medical Contraindication Are Reviewed   and Maintained by the School Authority     (11/2015)                                         (COMPLETE BOTH SIDES)                                  Approved [facility]  3/2016    HEALTH HISTORY      TO BE COMPLETED AND SIGNED BY PARENT/GUARDIAN AND VERIFIED BY HEALTH CARE PROVIDER  ALLERGIES: (Food, drug, insect, other) has No Known Allergies.   MEDICATION: (List all prescribed or taken on a regular basis.) has a current medication list which includes the following prescription(s): fluticasone (FLONASE) 50 MCG/ACT nasal spray.   Diagnosis of asthma?  Child wakes during night coughing? Yes    No  Loss of function of one of paired  organs?(eye/ear/kidney/testicle) No    Birth defects? No  Hospitalizations? No    Developmental delay? No   When? What for? No    Blood disorders? Hemophilia,  Sickle Cell, Other? Explain. No  Surgery? (List all.)  When? What for? No    Diabetes? No  Serious injury or illness? No    Head injury/Concussion/Passed out? No  TB skin test positive (past/present)? * No *[facility]   Seizures? What are they like?  No  TB disease (past or present)? * No *If yes, refer to [facility] dept   Heart problem/Shortness of breath?  No  Tobacco use (type, frequency)?  No    Heart murmur/High blood pressure? No  Alcohol/Drug use?  No    Dizziness or chest pain with exercise? No  Family history of sudden death  before age 50? (Cause?) No    Eye/Vision problems? ______           __Glasses          __Contacts  Last exam by eye doctor ______  Other concerns? (crossed eye, drooping lids, squinting, difficulty reading) Dental?   __ Braces     __ Bridge     __ Plate   __Other   Ear/Hearing problems? No  Information may be shared with appropriate personnel for health and educational purposes.   Bone/Joint problem/injury/scoliosis? No  Parent/Guardian  Signature                                               Date   PHYSICAL EXAMINATION REQUIREMENTS   Entire section below to be completed by MD/DO/APN/PA Head Circumference if <2-3 years old - /75   Pulse 78   Temp 98.3 °F (36.8 °C) (Temporal)   Ht 5' 5.5\" (1.664 m)   Wt (!) 119.7 kg (263 lb 14.3 oz)   improve.    Josi Guajardo, ROXANA  [facility] JOELLE        LMP 05/31/2022 (Within Days)   BMI 43.25 kg/m²   BSA 2.24 m²    >99 %ile (Z= 2.36) based on CDC (Girls, 2-20 Years) BMI-for-age based on BMI available as of 6/13/2022.   DIABETES SCREENING (NOT REQUIRED FOR ) BMI>85% age/sex: Yes     And any two of the following: Family History: No  Ethnic Minority: Yes    Signs of Insulin Resistance (hypertension, dyslipidemia, polycystic ovarian syndrome, acanthosis nigricans): No  At Risk: No   LEAD RISK QUESTIONNAIRE Required for children age 6 months through 6 years enrolled in licensed or public school operated day care, , nursery school and/or . (Blood test required if resides in [place] or high risk zip code.)  Questionnaire Administered? {-:[id number]}  Blood Test Indicated? {-:3613350978}   Blood Test Date/Result:    TB SKIN OR BLOOD TEST Recommended only for children in high-risk groups including children immunosuppressed due to HIV infection or other conditions, frequent travel to or born in high prevalence countries or those exposed to adults in high-risk categories. See CDC guidelines. http://www.cdc.gov/tb/publications/factsheets/testing/TB_testing.htm.  Test Needed: {YES NO DEFAULT:222981::\"No\"}     Test performed: {YES NO DEFAULT:410845::\"No\"}                          Skin Test:    Date Read              /      /             Result:   Positive__      Negative__        mm________                          Blood Test: Date Reported       /      /               Result:  Positive__      Negative__      Value________  LAB TESTS (recommended) Date/Result  Date/Results   Hemoglobin or Hematocrit 14.3 (6/19/2021) Sickle Cell (when indicated)    Urinalysis {-:755245::\"NA\"} Developmental Screening Tool {-:3448371900::\"Normal\"} - ASQ        SYSTEM REVIEW Normal  Comments/Follow-up/Needs  Normal Comments/Follow-up/Needs   Skin  {-:579604::\"Yes\"}  Endocrine {-:726472::\"Yes\"}    Ears {-:411061::\"Yes\"}                  Screening Result  Gastrointestinal {-:974087::\"Yes\"}    Eyes {-:750767::\"Yes\"}                  Screening Result: Genito-Urinary {-:596732::\"Yes\"}                                      LMP: 5/31/2022   Nose {-:275747::\"Yes\"}  Neurologic {-:140027::\"Yes\"}    Throat {-:027160::\"Yes\"}  Musculoskeletal {-:621254::\"Yes\"}    Mouth/Dental {-:990186::\"Yes\"}  Spinal Exam {-:200230::\"Yes\"}    Cardiovascular/HTN {-:397100::\"Yes\"}  Nutritional status {-:646892::\"Yes\"}    Respiratory {-:050479::\"Yes\"} Diagnosis of Asthma: No   Mental Health {-:753095::\"Yes\"}    Currently Prescribed Asthma Medication:        No  Quick-relief medication (e.g. Short Acting Beta Antagonist)        No  Controller medication (e.g. inhaled corticosteroid) Other     NEEDS/MODIFICATIONS required in the school setting: {-:111384::\"No restrictions\"} DIETARY Needs/Restrictions: {-:243392::\"No restrictions\"}   SPECIAL INSTRUCTIONS/DEVICES e.g. safety glasses, glass eye, chest protector for arrhythmia, pacemaker, prosthetic device, dental bridge, false teeth, athletic support/cup: {-:682655::\"No restrictions\"}   MENTAL HEALTH/OTHER Is there anything else the school should know about this student?  If you would like to discuss this student’s health with school or school health personnel:  {-:574137::\"Not needed\"}   EMERGENCY ACTION needed while at school due to child’s health condition (e.g. ,seizures, asthma, insect sting, food, peanut allergy, bleeding problem, diabetes, heart problem)?   {-:735372::\"No\"}   On the basis of the examination on this day, I approve this child’s participation in                                (If No or Modified please attach explanation.)  PHYSICAL EDUCATION:  {-:797760::\"Yes\"}                               [employer]   {-:006476::\"Yes\"}   Print Name  Leidy Burnette MD         Signature                                                                       Date: 6/13/2022   Address:  [facility] [place] [facility]  [facility] [address]  [address] [facility]  [address]  [facility] [address]  [phone]         (Complete Both Sides)     Approved [facility] 3/2016

## 2022-09-18 ENCOUNTER — HEALTH MAINTENANCE LETTER (OUTPATIENT)
Age: 52
End: 2022-09-18

## 2023-06-04 ENCOUNTER — HEALTH MAINTENANCE LETTER (OUTPATIENT)
Age: 53
End: 2023-06-04

## 2023-10-08 ENCOUNTER — HEALTH MAINTENANCE LETTER (OUTPATIENT)
Age: 53
End: 2023-10-08

## 2024-07-14 ENCOUNTER — HEALTH MAINTENANCE LETTER (OUTPATIENT)
Age: 54
End: 2024-07-14

## (undated) RX ORDER — PROPOFOL 10 MG/ML
INJECTION, EMULSION INTRAVENOUS
Status: DISPENSED
Start: 2021-05-25

## (undated) RX ORDER — LIDOCAINE HYDROCHLORIDE 10 MG/ML
INJECTION, SOLUTION INFILTRATION; PERINEURAL
Status: DISPENSED
Start: 2021-05-25

## (undated) RX ORDER — FENTANYL CITRATE 50 UG/ML
INJECTION, SOLUTION INTRAMUSCULAR; INTRAVENOUS
Status: DISPENSED
Start: 2019-05-13